# Patient Record
Sex: MALE | Race: WHITE | NOT HISPANIC OR LATINO | Employment: UNEMPLOYED | URBAN - METROPOLITAN AREA
[De-identification: names, ages, dates, MRNs, and addresses within clinical notes are randomized per-mention and may not be internally consistent; named-entity substitution may affect disease eponyms.]

---

## 2018-05-28 ENCOUNTER — HOSPITAL ENCOUNTER (EMERGENCY)
Facility: HOSPITAL | Age: 18
Discharge: HOME | End: 2018-05-28
Attending: EMERGENCY MEDICINE | Admitting: EMERGENCY MEDICINE
Payer: COMMERCIAL

## 2018-05-28 VITALS
RESPIRATION RATE: 18 BRPM | SYSTOLIC BLOOD PRESSURE: 139 MMHG | HEART RATE: 105 BPM | BODY MASS INDEX: 32.69 KG/M2 | WEIGHT: 196.21 LBS | HEIGHT: 65 IN | DIASTOLIC BLOOD PRESSURE: 79 MMHG | OXYGEN SATURATION: 96 %

## 2018-05-28 DIAGNOSIS — R21 RASH: Primary | ICD-10-CM

## 2018-05-28 PROCEDURE — 99282 EMERGENCY DEPT VISIT SF MDM: CPT

## 2018-05-28 NOTE — DISCHARGE INSTRUCTIONS
Acute Rash   WHAT YOU NEED TO KNOW:   A rash is irritation, redness, or itchiness in the skin or mucus membranes  Mucus membranes are areas such as the lining of your nose or throat  Acute means the rash starts suddenly, worsens quickly, and lasts a short time  An acute rash may be caused by a disease, such as hepatitis or vasculitis  The rash may be a reaction to something you are allergic to, such as certain foods, or latex  Certain medicines, including antibiotics, NSAIDs, prescription pain medicine, and aspirin can also cause a rash  DISCHARGE INSTRUCTIONS:   Return to the emergency department if:   · You have sudden trouble breathing or chest pain  · You are vomiting, have a headache or muscle aches, and your throat hurts  Contact your healthcare provider if:   · You have a fever  · You get open wounds from scratching your skin, or you have a wound that is red, swollen, or painful  · Your rash lasts longer than 3 months  · You have swelling or pain in your joints  · You have questions or concerns about your condition or care  Medicines:  If your rash does not go away on its own, you may need the following medicines:  · Antihistamines  may be given to help decrease itching  · Steroids  may be given to decrease inflammation  · Antibiotics  help fight or prevent a bacterial infection  · Take your medicine as directed  Contact your healthcare provider if you think your medicine is not helping or if you have side effects  Tell him of her if you are allergic to any medicine  Keep a list of the medicines, vitamins, and herbs you take  Include the amounts, and when and why you take them  Bring the list or the pill bottles to follow-up visits  Carry your medicine list with you in case of an emergency  Prevent a rash or care for your skin when you have a rash:  Dry skin can lead to more problems  Do not scratch your skin if it itches  You may cause a skin infection by scratching   The following may prevent dry skin, and help your skin look better:  · Use thick cream lotions or petroleum jelly to help soothe your rash  These products work well on areas with thick skin, such as your feet  Cool compresses may also be used to soothe your skin  Apply a cool compress or a cool, wet towel, and then cover it with a dry towel  · Use lukewarm water when you bathe  Hot water may damage your skin more  Pat your skin dry  Do not rub your skin with a towel  · Use detergents, soaps, shampoos, and bubble baths made for sensitive skin  Wear clothes that are made of cotton instead of nylon or wool  Cotton is softer, so it will not hurt your skin as much  Follow up with your healthcare provider as directed: You may need to see a dermatologist if healthcare providers do not know what is causing your rash  You may also need to see a dermatologist if your rash does not get better even with treatment  You may need to see a dietitian if you have allergies to foods  Write down your questions so you remember to ask them during your visits  © 2017 [address] [facility] Information is for End User's use only and may not be sold, redistributed or otherwise used for commercial purposes  All illustrations and images included in CareNotes® are the copyrighted property of [employer]  or [employer]  The above information is an  only  It is not intended as medical advice for individual conditions or treatments  Talk to your doctor, nurse or pharmacist before following any medical regimen to see if it is safe and effective for you

## 2018-05-28 NOTE — ED PROVIDER NOTES
History  Chief Complaint   Patient presents with    Rash     Pt presents to ED for evaluation and treatment of intermittent rash to arms and back which began a few months ago while pt was away at school  Pt states rash/hives will randomly "pop up" and then disappear  States hives started 45 minutes ago and has since resolved upon exam in ED       History provided by:  Patient   used: No      Patient is a 22-year-old male presenting to emergency department with a concern that he gets rashes from time to time  He had a rash today  It was itchy  No drainage from it  Currently without rash  No fevers, chills, nausea, vomiting  No lip or tongue swelling  No throat swelling  No difficulty breathing  No abdominal pain or diarrhea  No history of anaphylaxis  No new soaps, shampoos, clothes or pets  MDM normal exam, follow with PCP, follow up with an allergist if having rashes        None       No past medical history on file  No past surgical history on file  No family history on file  I have reviewed and agree with the history as documented  Social History   Substance Use Topics    Smoking status: Never Smoker    Smokeless tobacco: Not on file    Alcohol use No        Review of Systems   Constitutional: Negative for chills, diaphoresis and fever  Respiratory: Negative for cough, shortness of breath, wheezing and stridor  Cardiovascular: Negative for chest pain, palpitations and leg swelling  Gastrointestinal: Negative for abdominal pain, blood in stool, diarrhea, nausea and vomiting  Genitourinary: Negative for dysuria, frequency and urgency  Musculoskeletal: Negative for neck pain and neck stiffness  Skin: Negative for pallor and rash  Neurological: Negative for dizziness, syncope, weakness, light-headedness and headaches  All other systems reviewed and are negative        Physical Exam  Physical Exam   Constitutional: He is oriented to person, place, and time  He appears well-developed and well-nourished  HENT:   Head: Normocephalic and atraumatic  Eyes: Pupils are equal, round, and reactive to light  Neck: Neck supple  Cardiovascular: Normal rate, regular rhythm, normal heart sounds and intact distal pulses  Pulmonary/Chest: Effort normal and breath sounds normal  No respiratory distress  Abdominal: Soft  Bowel sounds are normal  There is no tenderness  Musculoskeletal: Normal range of motion  He exhibits no edema or tenderness  Neurological: He is alert and oriented to person, place, and time  Skin: Skin is warm and dry  Capillary refill takes less than 2 seconds  No erythema  No pallor  Vitals reviewed  Vital Signs  ED Triage Vitals [05/28/18 0252]   Temp Pulse Respirations Blood Pressure SpO2   -- 105 18 139/79 96 %      Temp src Heart Rate Source Patient Position - Orthostatic VS BP Location FiO2 (%)   -- Monitor Sitting Right arm --      Pain Score       No Pain           Vitals:    05/28/18 0252   BP: 139/79   Pulse: 105   Patient Position - Orthostatic VS: Sitting       Visual Acuity      ED Medications  Medications - No data to display    Diagnostic Studies  Results Reviewed     None                 No orders to display              Procedures  Procedures       Phone Contacts  ED Phone Contact    ED Course                               [facility]  [facility]    Disposition  Final diagnoses:   None     ED Disposition     None      Follow-up Information    None         Patient's Medications    No medications on file     No discharge procedures on file      ED Provider  Electronically Signed by           Yuki Burks MD  05/28/18 1858

## 2019-04-01 ENCOUNTER — OFFICE VISIT (OUTPATIENT)
Dept: FAMILY MEDICINE CLINIC | Facility: CLINIC | Age: 19
End: 2019-04-01

## 2019-04-01 VITALS
TEMPERATURE: 98.2 F | WEIGHT: 198 LBS | HEART RATE: 84 BPM | SYSTOLIC BLOOD PRESSURE: 120 MMHG | RESPIRATION RATE: 18 BRPM | DIASTOLIC BLOOD PRESSURE: 82 MMHG | HEIGHT: 65 IN | BODY MASS INDEX: 32.99 KG/M2

## 2019-04-01 DIAGNOSIS — Z00.00 HEALTHCARE MAINTENANCE: Primary | ICD-10-CM

## 2019-04-01 DIAGNOSIS — F41.9 ANXIETY: ICD-10-CM

## 2019-04-01 DIAGNOSIS — R00.2 PALPITATION: ICD-10-CM

## 2019-04-01 DIAGNOSIS — L50.8 CHRONIC URTICARIA: ICD-10-CM

## 2019-04-01 DIAGNOSIS — Z83.3 FAMILY HISTORY OF DIABETES MELLITUS (DM): ICD-10-CM

## 2019-04-01 DIAGNOSIS — Z13.6 SCREENING FOR CARDIOVASCULAR CONDITION: ICD-10-CM

## 2019-04-01 DIAGNOSIS — R36.0 PENILE DISCHARGE, WITHOUT BLOOD: ICD-10-CM

## 2019-04-01 PROCEDURE — 99385 PREV VISIT NEW AGE 18-39: CPT | Performed by: FAMILY MEDICINE

## 2019-04-01 RX ORDER — DIPHENHYDRAMINE HCL 50 MG
50 CAPSULE ORAL EVERY 6 HOURS PRN
Qty: 30 CAPSULE | Refills: 0 | Status: SHIPPED | OUTPATIENT
Start: 2019-04-01

## 2019-04-24 ENCOUNTER — APPOINTMENT (OUTPATIENT)
Dept: LAB | Facility: HOSPITAL | Age: 19
End: 2019-04-24
Payer: COMMERCIAL

## 2019-04-24 DIAGNOSIS — R36.0 PENILE DISCHARGE, WITHOUT BLOOD: ICD-10-CM

## 2019-04-24 DIAGNOSIS — Z13.6 SCREENING FOR CARDIOVASCULAR CONDITION: ICD-10-CM

## 2019-04-24 DIAGNOSIS — Z83.3 FAMILY HISTORY OF DIABETES MELLITUS (DM): ICD-10-CM

## 2019-04-24 DIAGNOSIS — Z00.00 HEALTHCARE MAINTENANCE: ICD-10-CM

## 2019-04-24 DIAGNOSIS — L50.8 CHRONIC URTICARIA: ICD-10-CM

## 2019-04-24 DIAGNOSIS — R00.2 PALPITATION: ICD-10-CM

## 2019-04-24 LAB
ALBUMIN SERPL BCP-MCNC: 4.2 G/DL (ref 3.5–5)
ALP SERPL-CCNC: 91 U/L (ref 46–116)
ALT SERPL W P-5'-P-CCNC: 31 U/L (ref 12–78)
ANION GAP SERPL CALCULATED.3IONS-SCNC: 3 MMOL/L (ref 4–13)
AST SERPL W P-5'-P-CCNC: 14 U/L (ref 5–45)
BACTERIA UR QL AUTO: NORMAL /HPF
BASOPHILS # BLD AUTO: 0.04 THOUSANDS/ΜL (ref 0–0.1)
BASOPHILS NFR BLD AUTO: 1 % (ref 0–1)
BILIRUB SERPL-MCNC: 0.45 MG/DL (ref 0.2–1)
BILIRUB UR QL STRIP: NEGATIVE
BUN SERPL-MCNC: 9 MG/DL (ref 5–25)
CALCIUM SERPL-MCNC: 8.9 MG/DL (ref 8.3–10.1)
CHLORIDE SERPL-SCNC: 106 MMOL/L (ref 100–108)
CHOLEST SERPL-MCNC: 140 MG/DL (ref 50–200)
CLARITY UR: ABNORMAL
CO2 SERPL-SCNC: 28 MMOL/L (ref 21–32)
COLOR UR: YELLOW
CREAT SERPL-MCNC: 0.89 MG/DL (ref 0.6–1.3)
EOSINOPHIL # BLD AUTO: 0.31 THOUSAND/ΜL (ref 0–0.61)
EOSINOPHIL NFR BLD AUTO: 6 % (ref 0–6)
ERYTHROCYTE [DISTWIDTH] IN BLOOD BY AUTOMATED COUNT: 12.3 % (ref 11.6–15.1)
GFR SERPL CREATININE-BSD FRML MDRD: 123 ML/MIN/1.73SQ M
GLUCOSE P FAST SERPL-MCNC: 78 MG/DL (ref 65–99)
GLUCOSE UR STRIP-MCNC: NEGATIVE MG/DL
HCT VFR BLD AUTO: 47.3 % (ref 36.5–49.3)
HDLC SERPL-MCNC: 39 MG/DL (ref 40–60)
HGB BLD-MCNC: 15.7 G/DL (ref 12–17)
HGB UR QL STRIP.AUTO: NEGATIVE
HYALINE CASTS #/AREA URNS LPF: NORMAL /LPF
IMM GRANULOCYTES # BLD AUTO: 0.01 THOUSAND/UL (ref 0–0.2)
IMM GRANULOCYTES NFR BLD AUTO: 0 % (ref 0–2)
KETONES UR STRIP-MCNC: NEGATIVE MG/DL
LDLC SERPL CALC-MCNC: 87 MG/DL (ref 0–100)
LEUKOCYTE ESTERASE UR QL STRIP: NEGATIVE
LYMPHOCYTES # BLD AUTO: 1.52 THOUSANDS/ΜL (ref 0.6–4.47)
LYMPHOCYTES NFR BLD AUTO: 29 % (ref 14–44)
MCH RBC QN AUTO: 29.8 PG (ref 26.8–34.3)
MCHC RBC AUTO-ENTMCNC: 33.2 G/DL (ref 31.4–37.4)
MCV RBC AUTO: 90 FL (ref 82–98)
MONOCYTES # BLD AUTO: 0.39 THOUSAND/ΜL (ref 0.17–1.22)
MONOCYTES NFR BLD AUTO: 7 % (ref 4–12)
NEUTROPHILS # BLD AUTO: 3.06 THOUSANDS/ΜL (ref 1.85–7.62)
NEUTS SEG NFR BLD AUTO: 57 % (ref 43–75)
NITRITE UR QL STRIP: NEGATIVE
NON-SQ EPI CELLS URNS QL MICRO: NORMAL /HPF
NRBC BLD AUTO-RTO: 0 /100 WBCS
PH UR STRIP.AUTO: 8.5 [PH]
PLATELET # BLD AUTO: 199 THOUSANDS/UL (ref 149–390)
PMV BLD AUTO: 9.8 FL (ref 8.9–12.7)
POTASSIUM SERPL-SCNC: 3.7 MMOL/L (ref 3.5–5.3)
PROT SERPL-MCNC: 7.6 G/DL (ref 6.4–8.2)
PROT UR STRIP-MCNC: ABNORMAL MG/DL
RBC # BLD AUTO: 5.26 MILLION/UL (ref 3.88–5.62)
RBC #/AREA URNS AUTO: NORMAL /HPF
SODIUM SERPL-SCNC: 137 MMOL/L (ref 136–145)
SP GR UR STRIP.AUTO: 1.02 (ref 1–1.03)
TRIGL SERPL-MCNC: 68 MG/DL
UROBILINOGEN UR QL STRIP.AUTO: 0.2 E.U./DL
WBC # BLD AUTO: 5.33 THOUSAND/UL (ref 4.31–10.16)
WBC #/AREA URNS AUTO: NORMAL /HPF

## 2019-04-24 PROCEDURE — 80053 COMPREHEN METABOLIC PANEL: CPT

## 2019-04-24 PROCEDURE — 87491 CHLMYD TRACH DNA AMP PROBE: CPT

## 2019-04-24 PROCEDURE — 80061 LIPID PANEL: CPT

## 2019-04-24 PROCEDURE — 86592 SYPHILIS TEST NON-TREP QUAL: CPT

## 2019-04-24 PROCEDURE — 36415 COLL VENOUS BLD VENIPUNCTURE: CPT

## 2019-04-24 PROCEDURE — 85025 COMPLETE CBC W/AUTO DIFF WBC: CPT

## 2019-04-24 PROCEDURE — 87591 N.GONORRHOEAE DNA AMP PROB: CPT

## 2019-04-24 PROCEDURE — 81001 URINALYSIS AUTO W/SCOPE: CPT | Performed by: FAMILY MEDICINE

## 2019-04-25 LAB — RPR SER QL: NORMAL

## 2019-04-29 LAB
C TRACH DNA SPEC QL NAA+PROBE: NEGATIVE
N GONORRHOEA DNA SPEC QL NAA+PROBE: NEGATIVE

## 2019-05-10 ENCOUNTER — OFFICE VISIT (OUTPATIENT)
Dept: FAMILY MEDICINE CLINIC | Facility: CLINIC | Age: 19
End: 2019-05-10

## 2019-05-10 ENCOUNTER — TELEPHONE (OUTPATIENT)
Dept: FAMILY MEDICINE CLINIC | Facility: CLINIC | Age: 19
End: 2019-05-10

## 2019-05-10 VITALS
TEMPERATURE: 97.9 F | DIASTOLIC BLOOD PRESSURE: 70 MMHG | BODY MASS INDEX: 32.32 KG/M2 | WEIGHT: 194 LBS | HEART RATE: 76 BPM | RESPIRATION RATE: 18 BRPM | HEIGHT: 65 IN | SYSTOLIC BLOOD PRESSURE: 118 MMHG

## 2019-05-10 DIAGNOSIS — F41.9 ANXIETY: ICD-10-CM

## 2019-05-10 DIAGNOSIS — L50.8 CHRONIC URTICARIA: Primary | ICD-10-CM

## 2019-05-10 DIAGNOSIS — R36.0 PENILE DISCHARGE, WITHOUT BLOOD: ICD-10-CM

## 2019-05-10 DIAGNOSIS — L73.9 FOLLICULITIS: ICD-10-CM

## 2019-05-10 PROBLEM — Z13.6 SCREENING FOR CARDIOVASCULAR CONDITION: Status: RESOLVED | Noted: 2019-04-01 | Resolved: 2019-05-10

## 2019-05-10 PROBLEM — R00.2 PALPITATION: Status: RESOLVED | Noted: 2019-04-01 | Resolved: 2019-05-10

## 2019-05-10 PROBLEM — Z00.00 HEALTHCARE MAINTENANCE: Status: RESOLVED | Noted: 2019-04-01 | Resolved: 2019-05-10

## 2019-05-10 PROCEDURE — 99213 OFFICE O/P EST LOW 20 MIN: CPT | Performed by: FAMILY MEDICINE

## 2019-05-10 PROCEDURE — 3008F BODY MASS INDEX DOCD: CPT | Performed by: FAMILY MEDICINE

## 2019-05-10 RX ORDER — CEPHALEXIN 500 MG/1
500 CAPSULE ORAL EVERY 12 HOURS SCHEDULED
Qty: 14 CAPSULE | Refills: 0 | Status: SHIPPED | OUTPATIENT
Start: 2019-05-10 | End: 2019-05-17

## 2019-05-22 ENCOUNTER — TELEPHONE (OUTPATIENT)
Dept: FAMILY MEDICINE CLINIC | Facility: CLINIC | Age: 19
End: 2019-05-22

## 2019-05-22 DIAGNOSIS — R36.0 PENILE DISCHARGE, WITHOUT BLOOD: Primary | ICD-10-CM

## 2019-05-24 ENCOUNTER — TELEPHONE (OUTPATIENT)
Dept: FAMILY MEDICINE CLINIC | Facility: CLINIC | Age: 19
End: 2019-05-24

## 2019-09-12 ENCOUNTER — CLINICAL SUPPORT (OUTPATIENT)
Dept: FAMILY MEDICINE CLINIC | Facility: CLINIC | Age: 19
End: 2019-09-12

## 2019-09-12 DIAGNOSIS — F41.9 ANXIETY: Primary | ICD-10-CM

## 2019-09-12 DIAGNOSIS — Z74.8 ASSISTANCE WITH TRANSPORTATION: ICD-10-CM

## 2019-09-12 NOTE — PROGRESS NOTES
Sammy Andrade struggles with anxiety  Part of his anxiety is social  He is interested in psychotherapy  However, the times that he has available for therapy, do not fit the times that I have available  Moreover, he has transportation issues because he does not drive, and money is tight  He will need assistance with transportation

## 2019-09-12 NOTE — PROGRESS NOTES
Data  This was my first session with Maxim  He reported being referred to me due to anxiety  According to Maxim, he has been feeling anxious for most of his life  Below is how his anxiety is affecting his life  School:  He has 2 and a half years of college, but has to drop off due to financial issues  He would like to go back and complete his major in theater  Family:  He does not speak to his biological father who  from his mother when he was born  He reported close relationship with mother and step father  Living situation  He reported feeling safe at home  He also reported feeling safe in his neighborhood  Coping:  He denied suicidal or homicidal ideations  He denied substance abuse  He denied smoking  Showed signs of social anxiety  Positive coping  Walking  Writing thoughts  Community support:  None reported  Transportation  None reported  Motivation to change:  Insightful and willing to try therapy  Jaleel Davidson is a young man, who is struggling with anxiety  He has never attended therapy  However, he is willing to try  At this point, he is changing from one job to another and is not sure about his schedule  The days that he has currently available, are not the days that I am available to see patients  I suggested him to contact his insurance for a list of mental health services in the community  However, due to his social anxiety, he requested help with this area  I told him that our  could help with this process  He is agreeable to speak with social work  I will place a referral to social work to help with mental health resources and transportation since [place] does not drive, and has financial constraints

## 2019-09-13 ENCOUNTER — PATIENT OUTREACH (OUTPATIENT)
Dept: FAMILY MEDICINE CLINIC | Facility: CLINIC | Age: 19
End: 2019-09-13

## 2019-09-20 ENCOUNTER — PATIENT OUTREACH (OUTPATIENT)
Dept: FAMILY MEDICINE CLINIC | Facility: CLINIC | Age: 19
End: 2019-09-20

## 2020-01-03 NOTE — PROGRESS NOTES
Received request to contact patient to provide support regarding outpatient mental health services in addition to transportation services  Call to patient regarding same  Patient reports he has been experiencing anxiety and is interested in pursuing counseling to address this  He reports that he is single with no children and resides with his mother and step-father  Patient reports he is employed and will be starting a new job  He reports that he has friends who assist him with transportation or he uses [employer]  Patient reports that he will also be using the public bus as he will need to use this to get to his new job  Supportive counseling provided to patient  Reviewed local in network counseling options and contact information provided  [facility] contact information also provided and patient will contact [facility] for further support as needed  Patient denies further needs at this time  Will continue to be available to provide support  [Negative] : Heme/Lymph [Joint Pain] : joint pain [Joint Stiffness] : joint stiffness

## 2020-09-24 NOTE — PROGRESS NOTES
BMI Counseling: Body mass index is 33 22 kg/m²  The BMI is above normal  Nutrition recommendations include 3-5 servings of fruits/vegetables daily, consuming healthier snacks, decreasing soda and/or juice intake and moderation in carbohydrate intake  Exercise recommendations include moderate aerobic physical activity for 150 minutes/week and exercising 3-5 times per week  Assessment/Plan:    Abdominal pressure  Patient concern for abdominal pressure  Exam today does not reveal any splenomegaly, no hernia, no structural abnormality of the abdominal wall  Differential diagnosis include increased gas, IBS, constipation etcetera  Patient cannot provide details in regards to his bowel movement habit  Will encourage patient to document his food intake as well as his bowel movements for the next month and discuss again at next visit with his annual physical exam    Anxiety  Likely contributing patient's multiple of somatic symptoms  Discussed specific techniques to relax the chest square breathing  Will continue to reassess and help with lifestyle modification    Chronic low back pain  Patient with baseline back pain likely secondary to posture  Recommended course muscle strengthening exercise  Will refer patient to on T4 treatment evaluation  On exam today patient does appear to have a even hiding his shoulder which could suggest a mild scoliosis      Subjective:      Patient ID: Carlito Lance is a 23 y o  male  HPI    13-year-old male patient presents with roughly 1 month history of left-sided abdominal pressure  Patient states he was working at [employer] when he 1st noticed his symptoms  This initially resolved on its on but has returned  Patient denies any increase in his symptoms, denies any pain but states he still does have a feeling of pressure on the left upper quadrant of his abdomen  Patient denies any recent injury, denies any recent falls today area    Has not noticed any lymphadenopathy or increased fatigue  Patient reports he has history of anxiety which can affect his bowel movement  Patient states he goes to the bathroom 1 to 2 times a day and does not have any issue with constipation  Review of Systems   Constitutional: Negative for chills and fever  HENT: Negative for congestion, rhinorrhea and sore throat  Respiratory: Negative for cough and shortness of breath  Cardiovascular: Negative for chest pain and palpitations  Gastrointestinal: Negative for abdominal distention, abdominal pain, blood in stool, constipation, diarrhea, nausea and vomiting  Genitourinary: Negative for dysuria and hematuria  Musculoskeletal: Positive for back pain  Negative for gait problem  Stable back pain   Skin: Negative for rash  Allergic/Immunologic: Negative for environmental allergies and food allergies  Neurological: Positive for headaches  Negative for dizziness and light-headedness  Stress   Psychiatric/Behavioral: Positive for sleep disturbance  Baseline issue with falling asleep     Objective:    /70 (BP Location: Left arm, Patient Position: Sitting, Cuff Size: Large)   Pulse 103   Temp 97 5 °F (36 4 °C) (Tympanic)   Resp 18   Ht 5' 5" (1 651 m)   Wt 90 5 kg (199 lb 9 6 oz)   BMI 33 22 kg/m²     Physical Exam  Vitals signs reviewed  Constitutional:       General: He is not in acute distress  Appearance: Normal appearance  He is obese  He is not ill-appearing, toxic-appearing or diaphoretic  Eyes:      General:         Right eye: No discharge  Left eye: No discharge  Extraocular Movements: Extraocular movements intact  Conjunctiva/sclera: Conjunctivae normal       Pupils: Pupils are equal, round, and reactive to light  Neck:      Musculoskeletal: No neck rigidity  Cardiovascular:      Rate and Rhythm: Normal rate and regular rhythm  Pulses: Normal pulses  Heart sounds: Normal heart sounds  No murmur  No friction rub   No gallop  Pulmonary:      Effort: Pulmonary effort is normal  No respiratory distress  Breath sounds: Normal breath sounds  Abdominal:      General: Abdomen is flat  Bowel sounds are normal  There is no distension  Palpations: Abdomen is soft  There is no mass  Tenderness: There is no abdominal tenderness  There is no right CVA tenderness, left CVA tenderness, guarding or rebound  Hernia: No hernia is present  Musculoskeletal: Normal range of motion  General: No swelling, tenderness, deformity or signs of injury  Right lower leg: No edema  Left lower leg: No edema  Comments: Right shoulder minimally elevated compared to the left at sitting and standing position   Skin:     General: Skin is warm and dry  Capillary Refill: Capillary refill takes less than 2 seconds  Coloration: Skin is not jaundiced or pale  Findings: No bruising, erythema, lesion or rash  Neurological:      General: No focal deficit present  Mental Status: He is alert and oriented to person, place, and time  Psychiatric:         Mood and Affect: Mood normal             ROSIE Ward    Family Medicine, PGY-3

## 2020-09-25 ENCOUNTER — OFFICE VISIT (OUTPATIENT)
Dept: FAMILY MEDICINE CLINIC | Facility: CLINIC | Age: 20
End: 2020-09-25

## 2020-09-25 VITALS
DIASTOLIC BLOOD PRESSURE: 70 MMHG | WEIGHT: 199.6 LBS | SYSTOLIC BLOOD PRESSURE: 118 MMHG | TEMPERATURE: 97.5 F | HEART RATE: 103 BPM | RESPIRATION RATE: 18 BRPM | HEIGHT: 65 IN | BODY MASS INDEX: 33.26 KG/M2

## 2020-09-25 DIAGNOSIS — Z11.4 SCREENING FOR HIV (HUMAN IMMUNODEFICIENCY VIRUS): Primary | ICD-10-CM

## 2020-09-25 DIAGNOSIS — G89.29 CHRONIC BILATERAL LOW BACK PAIN, UNSPECIFIED WHETHER SCIATICA PRESENT: ICD-10-CM

## 2020-09-25 DIAGNOSIS — F41.9 ANXIETY: ICD-10-CM

## 2020-09-25 DIAGNOSIS — M54.50 CHRONIC BILATERAL LOW BACK PAIN, UNSPECIFIED WHETHER SCIATICA PRESENT: ICD-10-CM

## 2020-09-25 DIAGNOSIS — R10.9 ABDOMINAL PRESSURE: ICD-10-CM

## 2020-09-25 PROCEDURE — 99213 OFFICE O/P EST LOW 20 MIN: CPT | Performed by: FAMILY MEDICINE

## 2020-09-25 PROCEDURE — 3725F SCREEN DEPRESSION PERFORMED: CPT | Performed by: FAMILY MEDICINE

## 2020-09-25 NOTE — ASSESSMENT & PLAN NOTE
Patient with baseline back pain likely secondary to posture  Recommended course muscle strengthening exercise  Will refer patient to on T4 treatment evaluation  On exam today patient does appear to have a even hiding his shoulder which could suggest a mild scoliosis

## 2020-09-25 NOTE — ASSESSMENT & PLAN NOTE
Likely contributing patient's multiple of somatic symptoms  Discussed specific techniques to relax the chest square breathing  Will continue to reassess and help with lifestyle modification

## 2020-09-25 NOTE — ASSESSMENT & PLAN NOTE
Patient concern for abdominal pressure  Exam today does not reveal any splenomegaly, no hernia, no structural abnormality of the abdominal wall  Differential diagnosis include increased gas, IBS, constipation etcetera  Patient cannot provide details in regards to his bowel movement habit  Will encourage patient to document his food intake as well as his bowel movements for the next month and discuss again at next visit with his annual physical exam

## 2020-09-28 ENCOUNTER — TELEMEDICINE (OUTPATIENT)
Dept: FAMILY MEDICINE CLINIC | Facility: CLINIC | Age: 20
End: 2020-09-28

## 2020-09-28 DIAGNOSIS — A08.4 VIRAL GASTROENTERITIS: Primary | ICD-10-CM

## 2020-09-28 PROCEDURE — 99213 OFFICE O/P EST LOW 20 MIN: CPT | Performed by: FAMILY MEDICINE

## 2020-09-28 PROCEDURE — 1036F TOBACCO NON-USER: CPT | Performed by: FAMILY MEDICINE

## 2020-09-28 NOTE — ASSESSMENT & PLAN NOTE
Nausea and fatigue,  Most likely secondary to viral gastro   Discussed warning signs with patient and advise continued hydration  With shared decision making it was decided that we would not do COVID-19 testing as suspicion is low   Discussed for patient to continue to monitor symptoms for the next week, and in the event his symptoms worsen or he does not improve to call back for another appointment to discuss possibility of testing for COVID-19

## 2020-09-28 NOTE — PROGRESS NOTES
Virtual Regular Visit      Assessment/Plan:    Problem List Items Addressed This Visit        Digestive    Viral gastroenteritis - Primary      Nausea and fatigue,  Most likely secondary to viral gastro   Discussed warning signs with patient and advise continued hydration  With shared decision making it was decided that we would not do COVID-19 testing as suspicion is low   Discussed for patient to continue to monitor symptoms for the next week, and in the event his symptoms worsen or he does not improve to call back for another appointment to discuss possibility of testing for COVID-19                    Reason for visit is   Chief Complaint   Patient presents with    Virtual Regular Visit        Encounter provider Henri Seo MD    Provider located at [address] [id number] [phone]      Recent Visits  Date Type Provider Dept   09/25/20 Office Visit MD Clayton Ziegler recent visits within past 7 days and meeting all other requirements     Today's Visits  Date Type Provider Dept   09/28/20 Telemedicine MD Clayton Cuevas today's visits and meeting all other requirements     Future Appointments  No visits were found meeting these conditions  Showing future appointments within next 150 days and meeting all other requirements        The patient was identified by name and date of birth  Ann Farrell was informed that this is a telemedicine visit and that the visit is being conducted through [employer]  My office door was closed  No one else was in the room  He acknowledged consent and understanding of privacy and security of the video platform  The patient has agreed to participate and understands they can discontinue the visit at any time  Patient is aware this is a billable service  Subjective  Ann Farrell is a 23 y o  male          Patient is calling today for new onset nausea and fatigue  Patient states that beginning yesterday started to notice some nausea  He states his symptoms  Typically come and go under not constant  He states that he still has an appetite and is able to eat and drink appropriately  He denies vomiting, abdominal pain, fever / chills  He states that he has not remember eating anything out of the ordinary yesterday  He does report that he has a mild cough that is nonproductive  He is worried about having COVID and  Just wanted to discuss his symptoms with a provider  Patient states that he works at [employer] but has not been around any other person who is under investigation for COVID-19 or a confirmed case  No past medical history on file  No past surgical history on file  Current Outpatient Medications   Medication Sig Dispense Refill    diphenhydrAMINE (BENADRYL) 50 mg capsule Take 1 capsule (50 mg total) by mouth every 6 (six) hours as needed for itching or allergies (Patient not taking: Reported on 5/10/2019) 30 capsule 0     No current facility-administered medications for this visit  No Known Allergies    Review of Systems   Constitutional: Positive for fatigue  Negative for appetite change, chills and fever  HENT: Negative for congestion, postnasal drip and sore throat  Eyes: Negative for visual disturbance  Respiratory: Positive for cough  Negative for chest tightness and shortness of breath  Cardiovascular: Negative for chest pain  Gastrointestinal: Positive for nausea  Negative for abdominal pain, constipation, diarrhea and vomiting  Skin: Negative for color change, pallor and rash  Neurological: Negative for dizziness, weakness, light-headedness and headaches  Video Exam    There were no vitals filed for this visit  Physical Exam  Constitutional:       General: He is not in acute distress  Appearance: Normal appearance  He is normal weight  He is not ill-appearing     HENT:      Head: Normocephalic and atraumatic  Nose: Nose normal    Eyes:      Conjunctiva/sclera: Conjunctivae normal    Neck:      Musculoskeletal: Normal range of motion  Pulmonary:      Effort: Pulmonary effort is normal  No respiratory distress  Neurological:      Mental Status: He is alert  I spent 10 minutes directly with the patient during this visit      [address] acknowledges that he has consented to an online visit or consultation  He understands that the online visit is based solely on information provided by him, and that, in the absence of a face-to-face physical evaluation by the physician, the diagnosis he receives is both limited and provisional in terms of accuracy and completeness  This is not intended to replace a full medical face-to-face evaluation by the physician  Reuben Rod understands and accepts these terms

## 2020-10-10 ENCOUNTER — HOSPITAL ENCOUNTER (EMERGENCY)
Facility: HOSPITAL | Age: 20
Discharge: HOME | End: 2020-10-10
Attending: EMERGENCY MEDICINE | Admitting: EMERGENCY MEDICINE
Payer: COMMERCIAL

## 2020-10-10 VITALS
SYSTOLIC BLOOD PRESSURE: 155 MMHG | HEART RATE: 90 BPM | TEMPERATURE: 98.7 F | OXYGEN SATURATION: 98 % | DIASTOLIC BLOOD PRESSURE: 88 MMHG | RESPIRATION RATE: 18 BRPM

## 2020-10-10 DIAGNOSIS — R10.9 ABDOMINAL DISCOMFORT: Primary | ICD-10-CM

## 2020-10-10 LAB
ALBUMIN SERPL BCP-MCNC: 4.2 G/DL (ref 3.5–5)
ALP SERPL-CCNC: 96 U/L (ref 46–116)
ALT SERPL W P-5'-P-CCNC: 42 U/L (ref 12–78)
ANION GAP SERPL CALCULATED.3IONS-SCNC: 2 MMOL/L (ref 4–13)
AST SERPL W P-5'-P-CCNC: 18 U/L (ref 5–45)
BASOPHILS # BLD AUTO: 0.02 THOUSANDS/ΜL (ref 0–0.1)
BASOPHILS NFR BLD AUTO: 0 % (ref 0–1)
BILIRUB SERPL-MCNC: 0.13 MG/DL (ref 0.2–1)
BILIRUB UR QL STRIP: NEGATIVE
BUN SERPL-MCNC: 8 MG/DL (ref 5–25)
CALCIUM SERPL-MCNC: 8.8 MG/DL (ref 8.3–10.1)
CHLORIDE SERPL-SCNC: 105 MMOL/L (ref 100–108)
CLARITY UR: CLEAR
CO2 SERPL-SCNC: 33 MMOL/L (ref 21–32)
COLOR UR: YELLOW
CREAT SERPL-MCNC: 1.09 MG/DL (ref 0.6–1.3)
EOSINOPHIL # BLD AUTO: 0.13 THOUSAND/ΜL (ref 0–0.61)
EOSINOPHIL NFR BLD AUTO: 1 % (ref 0–6)
ERYTHROCYTE [DISTWIDTH] IN BLOOD BY AUTOMATED COUNT: 12.1 % (ref 11.6–15.1)
GFR SERPL CREATININE-BSD FRML MDRD: 96 ML/MIN/1.73SQ M
GLUCOSE SERPL-MCNC: 109 MG/DL (ref 65–140)
GLUCOSE UR STRIP-MCNC: NEGATIVE MG/DL
HCT VFR BLD AUTO: 42.8 % (ref 36.5–49.3)
HGB BLD-MCNC: 14.8 G/DL (ref 12–17)
HGB UR QL STRIP.AUTO: NEGATIVE
IMM GRANULOCYTES # BLD AUTO: 0.02 THOUSAND/UL (ref 0–0.2)
IMM GRANULOCYTES NFR BLD AUTO: 0 % (ref 0–2)
KETONES UR STRIP-MCNC: NEGATIVE MG/DL
LEUKOCYTE ESTERASE UR QL STRIP: NEGATIVE
LIPASE SERPL-CCNC: 97 U/L (ref 73–393)
LYMPHOCYTES # BLD AUTO: 2.06 THOUSANDS/ΜL (ref 0.6–4.47)
LYMPHOCYTES NFR BLD AUTO: 21 % (ref 14–44)
MCH RBC QN AUTO: 30.1 PG (ref 26.8–34.3)
MCHC RBC AUTO-ENTMCNC: 34.6 G/DL (ref 31.4–37.4)
MCV RBC AUTO: 87 FL (ref 82–98)
MONOCYTES # BLD AUTO: 0.74 THOUSAND/ΜL (ref 0.17–1.22)
MONOCYTES NFR BLD AUTO: 8 % (ref 4–12)
NEUTROPHILS # BLD AUTO: 6.82 THOUSANDS/ΜL (ref 1.85–7.62)
NEUTS SEG NFR BLD AUTO: 70 % (ref 43–75)
NITRITE UR QL STRIP: NEGATIVE
NRBC BLD AUTO-RTO: 0 /100 WBCS
PH UR STRIP.AUTO: 7 [PH] (ref 4.5–8)
PLATELET # BLD AUTO: 215 THOUSANDS/UL (ref 149–390)
PMV BLD AUTO: 9.3 FL (ref 8.9–12.7)
POTASSIUM SERPL-SCNC: 3.5 MMOL/L (ref 3.5–5.3)
PROT SERPL-MCNC: 7.6 G/DL (ref 6.4–8.2)
PROT UR STRIP-MCNC: NEGATIVE MG/DL
RBC # BLD AUTO: 4.91 MILLION/UL (ref 3.88–5.62)
SODIUM SERPL-SCNC: 140 MMOL/L (ref 136–145)
SP GR UR STRIP.AUTO: 1.02 (ref 1–1.03)
UROBILINOGEN UR QL STRIP.AUTO: 1 E.U./DL
WBC # BLD AUTO: 9.79 THOUSAND/UL (ref 4.31–10.16)

## 2020-10-10 PROCEDURE — 80053 COMPREHEN METABOLIC PANEL: CPT | Performed by: EMERGENCY MEDICINE

## 2020-10-10 PROCEDURE — 85025 COMPLETE CBC W/AUTO DIFF WBC: CPT | Performed by: EMERGENCY MEDICINE

## 2020-10-10 PROCEDURE — 99282 EMERGENCY DEPT VISIT SF MDM: CPT | Performed by: EMERGENCY MEDICINE

## 2020-10-10 PROCEDURE — 83690 ASSAY OF LIPASE: CPT | Performed by: EMERGENCY MEDICINE

## 2020-10-10 PROCEDURE — 36415 COLL VENOUS BLD VENIPUNCTURE: CPT | Performed by: EMERGENCY MEDICINE

## 2020-10-10 PROCEDURE — 81003 URINALYSIS AUTO W/O SCOPE: CPT

## 2020-10-10 PROCEDURE — 99284 EMERGENCY DEPT VISIT MOD MDM: CPT

## 2020-10-21 ENCOUNTER — OFFICE VISIT (OUTPATIENT)
Dept: FAMILY MEDICINE CLINIC | Facility: CLINIC | Age: 20
End: 2020-10-21

## 2020-10-21 VITALS — BODY MASS INDEX: 33.29 KG/M2 | WEIGHT: 199.8 LBS | HEIGHT: 65 IN

## 2020-10-21 DIAGNOSIS — M54.50 CHRONIC BILATERAL LOW BACK PAIN, UNSPECIFIED WHETHER SCIATICA PRESENT: Primary | ICD-10-CM

## 2020-10-21 DIAGNOSIS — G89.29 CHRONIC BILATERAL LOW BACK PAIN, UNSPECIFIED WHETHER SCIATICA PRESENT: Primary | ICD-10-CM

## 2020-10-21 PROCEDURE — 3008F BODY MASS INDEX DOCD: CPT | Performed by: FAMILY MEDICINE

## 2020-10-21 PROCEDURE — 98927 OSTEOPATH MANJ 5-6 REGIONS: CPT | Performed by: FAMILY MEDICINE

## 2020-10-27 ENCOUNTER — OFFICE VISIT (OUTPATIENT)
Dept: FAMILY MEDICINE CLINIC | Facility: CLINIC | Age: 20
End: 2020-10-27

## 2020-10-27 VITALS
DIASTOLIC BLOOD PRESSURE: 100 MMHG | SYSTOLIC BLOOD PRESSURE: 150 MMHG | WEIGHT: 201.6 LBS | HEIGHT: 65 IN | BODY MASS INDEX: 33.59 KG/M2 | TEMPERATURE: 96.6 F | HEART RATE: 94 BPM | RESPIRATION RATE: 18 BRPM

## 2020-10-27 DIAGNOSIS — Z11.4 SCREENING FOR HIV (HUMAN IMMUNODEFICIENCY VIRUS): ICD-10-CM

## 2020-10-27 DIAGNOSIS — Z83.3 FAMILY HISTORY OF DIABETES MELLITUS (DM): ICD-10-CM

## 2020-10-27 DIAGNOSIS — R10.9 ABDOMINAL PRESSURE: ICD-10-CM

## 2020-10-27 DIAGNOSIS — Z00.00 ANNUAL PHYSICAL EXAM: Primary | ICD-10-CM

## 2020-10-27 DIAGNOSIS — R03.0 ELEVATED BLOOD PRESSURE READING: ICD-10-CM

## 2020-10-27 PROCEDURE — 99395 PREV VISIT EST AGE 18-39: CPT | Performed by: FAMILY MEDICINE

## 2020-11-03 ENCOUNTER — OFFICE VISIT (OUTPATIENT)
Dept: FAMILY MEDICINE CLINIC | Facility: CLINIC | Age: 20
End: 2020-11-03

## 2020-11-03 VITALS — WEIGHT: 201.6 LBS | HEIGHT: 65 IN | BODY MASS INDEX: 33.59 KG/M2 | TEMPERATURE: 97.8 F

## 2020-11-03 DIAGNOSIS — M54.50 CHRONIC BILATERAL LOW BACK PAIN, UNSPECIFIED WHETHER SCIATICA PRESENT: Primary | ICD-10-CM

## 2020-11-03 DIAGNOSIS — M99.02 SOMATIC DYSFUNCTION OF THORACIC REGION: ICD-10-CM

## 2020-11-03 DIAGNOSIS — G89.29 CHRONIC BILATERAL LOW BACK PAIN, UNSPECIFIED WHETHER SCIATICA PRESENT: Primary | ICD-10-CM

## 2020-11-03 DIAGNOSIS — M99.01 SOMATIC DYSFUNCTION OF CERVICAL REGION: ICD-10-CM

## 2020-11-03 DIAGNOSIS — M99.03 SOMATIC DYSFUNCTION OF LUMBAR REGION: ICD-10-CM

## 2020-11-03 DIAGNOSIS — M99.04 SOMATIC DYSFUNCTION OF SACRAL REGION: ICD-10-CM

## 2020-11-03 PROCEDURE — 99213 OFFICE O/P EST LOW 20 MIN: CPT | Performed by: FAMILY MEDICINE

## 2020-11-17 ENCOUNTER — OFFICE VISIT (OUTPATIENT)
Dept: FAMILY MEDICINE CLINIC | Facility: CLINIC | Age: 20
End: 2020-11-17

## 2020-11-17 VITALS — TEMPERATURE: 96.1 F

## 2020-11-17 DIAGNOSIS — M99.02 SOMATIC DYSFUNCTION OF THORACIC REGION: ICD-10-CM

## 2020-11-17 DIAGNOSIS — M99.01 SOMATIC DYSFUNCTION OF CERVICAL REGION: ICD-10-CM

## 2020-11-17 DIAGNOSIS — M99.04 SOMATIC DYSFUNCTION OF SACRAL REGION: ICD-10-CM

## 2020-11-17 DIAGNOSIS — G89.29 CHRONIC BILATERAL LOW BACK PAIN, UNSPECIFIED WHETHER SCIATICA PRESENT: Primary | ICD-10-CM

## 2020-11-17 DIAGNOSIS — M54.50 CHRONIC BILATERAL LOW BACK PAIN, UNSPECIFIED WHETHER SCIATICA PRESENT: Primary | ICD-10-CM

## 2020-11-17 DIAGNOSIS — M99.03 SOMATIC DYSFUNCTION OF LUMBAR REGION: ICD-10-CM

## 2020-11-17 PROCEDURE — 99213 OFFICE O/P EST LOW 20 MIN: CPT | Performed by: FAMILY MEDICINE

## 2020-11-18 ENCOUNTER — HOSPITAL ENCOUNTER (OUTPATIENT)
Dept: RADIOLOGY | Facility: HOSPITAL | Age: 20
Discharge: HOME | End: 2020-11-18
Payer: COMMERCIAL

## 2020-11-18 DIAGNOSIS — M54.50 CHRONIC BILATERAL LOW BACK PAIN, UNSPECIFIED WHETHER SCIATICA PRESENT: ICD-10-CM

## 2020-11-18 DIAGNOSIS — G89.29 CHRONIC BILATERAL LOW BACK PAIN, UNSPECIFIED WHETHER SCIATICA PRESENT: ICD-10-CM

## 2020-11-18 PROCEDURE — 72083 X-RAY EXAM ENTIRE SPI 4/5 VW: CPT

## 2020-11-24 ENCOUNTER — OFFICE VISIT (OUTPATIENT)
Dept: FAMILY MEDICINE CLINIC | Facility: CLINIC | Age: 20
End: 2020-11-24

## 2020-11-24 VITALS
RESPIRATION RATE: 18 BRPM | HEART RATE: 108 BPM | DIASTOLIC BLOOD PRESSURE: 70 MMHG | WEIGHT: 201.2 LBS | HEIGHT: 65 IN | BODY MASS INDEX: 33.52 KG/M2 | SYSTOLIC BLOOD PRESSURE: 118 MMHG | TEMPERATURE: 96.7 F

## 2020-11-24 DIAGNOSIS — Z23 ENCOUNTER FOR IMMUNIZATION: ICD-10-CM

## 2020-11-24 DIAGNOSIS — G89.29 CHRONIC BILATERAL LOW BACK PAIN, UNSPECIFIED WHETHER SCIATICA PRESENT: ICD-10-CM

## 2020-11-24 DIAGNOSIS — M54.50 CHRONIC BILATERAL LOW BACK PAIN, UNSPECIFIED WHETHER SCIATICA PRESENT: ICD-10-CM

## 2020-11-24 DIAGNOSIS — Z83.3 FAMILY HISTORY OF DIABETES MELLITUS (DM): ICD-10-CM

## 2020-11-24 DIAGNOSIS — Z20.822 ENCOUNTER FOR SCREENING LABORATORY TESTING FOR COVID-19 VIRUS IN ASYMPTOMATIC PATIENT: ICD-10-CM

## 2020-11-24 DIAGNOSIS — Z23 ENCOUNTER FOR ADMINISTRATION OF VACCINE: ICD-10-CM

## 2020-11-24 PROBLEM — Z11.52 ENCOUNTER FOR SCREENING LABORATORY TESTING FOR COVID-19 VIRUS IN ASYMPTOMATIC PATIENT: Status: ACTIVE | Noted: 2020-11-24

## 2020-11-24 PROBLEM — Z01.812 ENCOUNTER FOR SCREENING LABORATORY TESTING FOR COVID-19 VIRUS IN ASYMPTOMATIC PATIENT: Status: ACTIVE | Noted: 2020-11-24

## 2020-11-24 PROCEDURE — 90471 IMMUNIZATION ADMIN: CPT | Performed by: FAMILY MEDICINE

## 2020-11-24 PROCEDURE — 90686 IIV4 VACC NO PRSV 0.5 ML IM: CPT | Performed by: FAMILY MEDICINE

## 2020-11-24 PROCEDURE — 1036F TOBACCO NON-USER: CPT | Performed by: FAMILY MEDICINE

## 2020-11-24 PROCEDURE — 99213 OFFICE O/P EST LOW 20 MIN: CPT | Performed by: FAMILY MEDICINE

## 2020-11-24 PROCEDURE — 3008F BODY MASS INDEX DOCD: CPT | Performed by: FAMILY MEDICINE

## 2020-11-24 PROCEDURE — U0003 INFECTIOUS AGENT DETECTION BY NUCLEIC ACID (DNA OR RNA); SEVERE ACUTE RESPIRATORY SYNDROME CORONAVIRUS 2 (SARS-COV-2) (CORONAVIRUS DISEASE [COVID-19]), AMPLIFIED PROBE TECHNIQUE, MAKING USE OF HIGH THROUGHPUT TECHNOLOGIES AS DESCRIBED BY CMS-2020-01-R: HCPCS | Performed by: FAMILY MEDICINE

## 2020-11-26 LAB — SARS-COV-2 RNA SPEC QL NAA+PROBE: NOT DETECTED

## 2020-12-01 ENCOUNTER — TELEPHONE (OUTPATIENT)
Dept: FAMILY MEDICINE CLINIC | Facility: CLINIC | Age: 20
End: 2020-12-01

## 2021-02-18 ENCOUNTER — TELEMEDICINE (OUTPATIENT)
Dept: FAMILY MEDICINE CLINIC | Facility: CLINIC | Age: 21
End: 2021-02-18

## 2021-02-18 DIAGNOSIS — G47.00 INSOMNIA, UNSPECIFIED TYPE: Primary | ICD-10-CM

## 2021-02-18 DIAGNOSIS — F41.9 ANXIETY: ICD-10-CM

## 2021-02-18 PROCEDURE — 99213 OFFICE O/P EST LOW 20 MIN: CPT | Performed by: FAMILY MEDICINE

## 2021-02-18 RX ORDER — SAW/PYGEUM/BETA/HERB/D3/B6/ZN 30 MG-25MG
1 CAPSULE ORAL
Qty: 30 TABLET | Refills: 1 | Status: SHIPPED | OUTPATIENT
Start: 2021-02-18

## 2021-02-18 NOTE — PROGRESS NOTES
Virtual Brief Visit    Assessment/Plan:    Problem List Items Addressed This Visit        Other    Anxiety     Mild to moderate anxiety symptoms,   VINCENT-7: 6  Encourage patient to pursue new hobbies that may interest him  Patient to follow with Dr Miguel for CBT         Insomnia - Primary     Patient having occasional issues with falling and staying asleep  Anxiety likely contributing to his symptoms  Will prescribe ER melatonin 10mg   Encourage patient to follow up with Dr Miguel for CBT         Relevant Medications    Melatonin ER 10 MG TBCR        Patient to complete blood work from October of 2020        Reason for visit is   Chief Complaint   Patient presents with    Virtual Brief Visit        Encounter provider Eugene Hernandez MD    Provider located at [address] [place] [id number] [phone]    Recent Visits  No visits were found meeting these conditions  Showing recent visits within past 7 days and meeting all other requirements     Today's Visits  Date Type Provider Dept   02/18/21 Telemedicine Eugene Hernandez MD [facility] today's visits and meeting all other requirements     Future Appointments  No visits were found meeting these conditions  Showing future appointments within next 150 days and meeting all other requirements        After connecting through telephone, the patient was identified by name and date of birth  Lissette Holden was informed that this is a telemedicine visit and that the visit is being conducted through telephone  My office door was closed  No one else was in the room  He acknowledged consent and understanding of privacy and security of the platform  The patient has agreed to participate and understands he can discontinue the visit at any time  Patient is aware this is a billable service       It was my intent to perform this visit via video technology but the patient was not able to do a video connection so the visit was completed via audio telephone only  Subjective    Letta Merlin is a 23 y o  male presents via telephone for virtual visit  HPI   Patient presents for follow up regarding anxiety and difficulty with sleep  Patient has had anxiety in the past but has noted more difficult falling and staying asleep  Patient specifically some anxiety regarding work which lead to nights were he had difficulty sleeping  Patient has an irregular sleep schedule, keep his TV on and sleep in a warm room  Patient states there is also come anxiety regarding COVID-19  Patient has seen Noam Jacinto in the past and would like to start seeing her again  Patient reports having trouble with finding passion about his hobbies  Patient also mentioned that "he doesn't like living in a [employer]"  VINCENT-7 Flowsheet Screening      Most Recent Value   Over the last two weeks, how often have you been bothered by the following problems? Feeling nervous, anxious, or on edge  1   Not being able to stop or control worrying  1   Worrying too much about different things  1   Trouble relaxing   1   Being so restless that it's hard to sit still  0   Becoming easily annoyed or irritable   1   Feeling afraid as if something awful might happen  0   How difficult have these problems made it for you to do your work, take care of things at home, or get along with other people? Somewhat difficult   VINCENT Score   5            No past medical history on file  No past surgical history on file  Current Outpatient Medications   Medication Sig Dispense Refill    diphenhydrAMINE (BENADRYL) 50 mg capsule Take 1 capsule (50 mg total) by mouth every 6 (six) hours as needed for itching or allergies (Patient not taking: Reported on 5/10/2019) 30 capsule 0    Melatonin ER 10 MG TBCR Take 1 tablet (10 mg total) by mouth daily at bedtime 30 tablet 1     No current facility-administered medications for this visit  No Known Allergies    Review of Systems   As noted above    VIRTUAL VISIT [address] acknowledges that he has consented to an online visit or consultation  He understands that the online visit is based solely on information provided by him, and that, in the absence of a face-to-face physical evaluation by the physician, the diagnosis he receives is both limited and provisional in terms of accuracy and completeness  This is not intended to replace a full medical face-to-face evaluation by the physician  Ramos Harrington understands and accepts these terms  ROSIE Ahmadi  Family Medicine, PGY-3    Please excuse any "sound-alike" errors that may have ocurred during the process of dictation  Parts of this note have been dictated and there may be errors present in the transcription process  Thank you

## 2021-02-18 NOTE — ASSESSMENT & PLAN NOTE
Patient having occasional issues with falling and staying asleep  Anxiety likely contributing to his symptoms  Will prescribe ER melatonin 10mg   Encourage patient to follow up with Dr Miguel for CBT

## 2021-02-18 NOTE — ASSESSMENT & PLAN NOTE
Mild to moderate anxiety symptoms,   VINCENT-7: 6  Encourage patient to pursue new hobbies that may interest him  Patient to follow with Dr Miguel for CBT

## 2021-04-08 ENCOUNTER — OFFICE VISIT (OUTPATIENT)
Dept: FAMILY MEDICINE CLINIC | Facility: CLINIC | Age: 21
End: 2021-04-08

## 2021-04-08 VITALS
HEART RATE: 90 BPM | DIASTOLIC BLOOD PRESSURE: 70 MMHG | HEIGHT: 65 IN | TEMPERATURE: 98 F | SYSTOLIC BLOOD PRESSURE: 110 MMHG | BODY MASS INDEX: 34.85 KG/M2 | OXYGEN SATURATION: 97 % | WEIGHT: 209.2 LBS | RESPIRATION RATE: 18 BRPM

## 2021-04-08 DIAGNOSIS — Z23 ENCOUNTER FOR IMMUNIZATION: ICD-10-CM

## 2021-04-08 DIAGNOSIS — M79.601 PAIN OF RIGHT UPPER EXTREMITY: Primary | ICD-10-CM
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
## 2021-04-08 PROCEDURE — 99213 OFFICE O/P EST LOW 20 MIN: CPT | Performed by: FAMILY MEDICINE

## 2021-04-08 PROCEDURE — 3008F BODY MASS INDEX DOCD: CPT | Performed by: FAMILY MEDICINE

## 2021-04-08 PROCEDURE — 1036F TOBACCO NON-USER: CPT | Performed by: FAMILY MEDICINE

## 2021-04-08 NOTE — ASSESSMENT & PLAN NOTE
Dog bite 2 days ago with purpura and bruising but no breakage in skin or puncture wounds  No fevers chills nausea vomiting or concerns for infection  Would like to have given tetanus booster today however patient is getting 1st dose of COVID vaccine next week so will hold off  Advised patient to get 1st and 2nd dose of COVID vaccine then come back for nurse visit after that for TD booster  He will find out more patient dogs vaccinations status and follow-up  Advised if dog has not been vaccinated for rabies him to come back for rabies vaccine  And he should get that before his COVID vaccine  Low suspicion for infection at this point but precautions were discussed

## 2021-04-08 NOTE — PROGRESS NOTES
Assessment/Plan:    Dog bite    Dog bite 2 days ago with purpura and bruising but no breakage in skin or puncture wounds  No fevers chills nausea vomiting or concerns for infection  Would like to have given tetanus booster today however patient is getting 1st dose of COVID vaccine next week so will hold off  Advised patient to get 1st and 2nd dose of COVID vaccine then come back for nurse visit after that for TD booster  He will find out more patient dogs vaccinations status and follow-up  Advised if dog has not been vaccinated for rabies him to come back for rabies vaccine  And he should get that before his COVID vaccine  Low suspicion for infection at this point but precautions were discussed  Problem List Items Addressed This Visit        Other    Dog bite       Dog bite 2 days ago with purpura and bruising but no breakage in skin or puncture wounds  No fevers chills nausea vomiting or concerns for infection  Would like to have given tetanus booster today however patient is getting 1st dose of COVID vaccine next week so will hold off  Advised patient to get 1st and 2nd dose of COVID vaccine then come back for nurse visit after that for TD booster  He will find out more patient dogs vaccinations status and follow-up  Advised if dog has not been vaccinated for rabies him to come back for rabies vaccine  And he should get that before his COVID vaccine  Low suspicion for infection at this point but precautions were discussed  Other Visit Diagnoses     Encounter for immunization    -  Primary            Subjective:      Patient ID: Arron Parks is a 23 y o  male  Present clinic for dog bite on right arm  Was bit 2 days ago at friend's house  Denies any bleeding drainage redness or severe pain from arm  No nausea vomiting or fevers  He is unsure when the last time he had tetanus vaccine  He is also unsure the dog's vaccination status  The dog is house dog and was not wild    No issues with strength or sensation in arm  he is getting 1st dose of COVID vaccine next week  The following portions of the patient's history were reviewed and updated as appropriate: allergies, current medications, past family history, past medical history, past social history, past surgical history and problem list     Review of Systems   Constitutional: Negative for chills and fever  HENT: Negative for congestion, rhinorrhea and sore throat  Respiratory: Negative for cough and shortness of breath  Cardiovascular: Negative for chest pain and palpitations  Gastrointestinal: Negative for abdominal pain, diarrhea, nausea and vomiting  Musculoskeletal: Negative for myalgias  Skin: Positive for color change and wound  Neurological: Negative for headaches  All other systems reviewed and are negative  Objective:      /70 (BP Location: Left arm, Patient Position: Sitting, Cuff Size: Standard)   Pulse 90   Temp 98 °F (36 7 °C) (Tympanic)   Resp 18   Ht 5' 5" (1 651 m)   Wt 94 9 kg (209 lb 3 2 oz)   SpO2 97%   BMI 34 81 kg/m²          Physical Exam  Vitals signs and nursing note reviewed  Constitutional:       General: He is not in acute distress  Appearance: Normal appearance  He is not ill-appearing, toxic-appearing or diaphoretic  Cardiovascular:      Rate and Rhythm: Normal rate and regular rhythm  Pulses: Normal pulses  Heart sounds: Normal heart sounds  Musculoskeletal:      Comments:   Large area of purpura and yellow bruising on ventral surface of right arm  No puncture wounds just   Minimalsuperficial scratches  No discharge or bleeding or areas of fluctuation  Neurological:      General: No focal deficit present  Mental Status: He is alert and oriented to person, place, and time

## 2021-10-16 ENCOUNTER — HOSPITAL ENCOUNTER (EMERGENCY)
Facility: HOSPITAL | Age: 21
Discharge: HOME | End: 2021-10-16
Attending: EMERGENCY MEDICINE | Admitting: EMERGENCY MEDICINE
Payer: COMMERCIAL

## 2021-10-16 VITALS
SYSTOLIC BLOOD PRESSURE: 145 MMHG | DIASTOLIC BLOOD PRESSURE: 100 MMHG | HEART RATE: 92 BPM | OXYGEN SATURATION: 100 % | TEMPERATURE: 98.8 F | RESPIRATION RATE: 18 BRPM

## 2021-10-16 DIAGNOSIS — R10.9 ABDOMINAL PAIN: Primary | ICD-10-CM

## 2021-10-16 PROCEDURE — 99282 EMERGENCY DEPT VISIT SF MDM: CPT | Performed by: EMERGENCY MEDICINE

## 2021-10-16 PROCEDURE — 99283 EMERGENCY DEPT VISIT LOW MDM: CPT

## 2022-01-10 PROCEDURE — U0003 INFECTIOUS AGENT DETECTION BY NUCLEIC ACID (DNA OR RNA); SEVERE ACUTE RESPIRATORY SYNDROME CORONAVIRUS 2 (SARS-COV-2) (CORONAVIRUS DISEASE [COVID-19]), AMPLIFIED PROBE TECHNIQUE, MAKING USE OF HIGH THROUGHPUT TECHNOLOGIES AS DESCRIBED BY CMS-2020-01-R: HCPCS | Performed by: FAMILY MEDICINE

## 2022-01-10 PROCEDURE — U0005 INFEC AGEN DETEC AMPLI PROBE: HCPCS | Performed by: FAMILY MEDICINE

## 2022-02-07 ENCOUNTER — OFFICE VISIT (OUTPATIENT)
Dept: FAMILY MEDICINE CLINIC | Facility: CLINIC | Age: 22
End: 2022-02-07

## 2022-02-07 VITALS
HEIGHT: 64 IN | WEIGHT: 200 LBS | DIASTOLIC BLOOD PRESSURE: 76 MMHG | HEART RATE: 83 BPM | BODY MASS INDEX: 34.15 KG/M2 | RESPIRATION RATE: 21 BRPM | TEMPERATURE: 98.5 F | OXYGEN SATURATION: 98 % | SYSTOLIC BLOOD PRESSURE: 115 MMHG

## 2022-02-07 DIAGNOSIS — F41.9 ANXIETY: ICD-10-CM

## 2022-02-07 DIAGNOSIS — Z00.00 ANNUAL PHYSICAL EXAM: Primary | ICD-10-CM

## 2022-02-07 DIAGNOSIS — G47.00 INSOMNIA, UNSPECIFIED TYPE: ICD-10-CM

## 2022-02-07 PROCEDURE — 1036F TOBACCO NON-USER: CPT | Performed by: FAMILY MEDICINE

## 2022-02-07 PROCEDURE — 3008F BODY MASS INDEX DOCD: CPT | Performed by: FAMILY MEDICINE

## 2022-02-07 PROCEDURE — 99395 PREV VISIT EST AGE 18-39: CPT | Performed by: FAMILY MEDICINE

## 2022-02-07 NOTE — ASSESSMENT & PLAN NOTE
- Patient complains of trouble falling asleep and feeling fatigued upon awakening  He also reports loud snoring  Patient scored 3 points on STOP BANG assessment  High risk of OCHOA  -  Etiology of insomnia could be either due to anxiety vs  OCHOA  - Will refer to sleep medicine for evaluation for OCHOA and referral to  for outpatient therapy

## 2022-02-07 NOTE — ASSESSMENT & PLAN NOTE
- Worsening  - Patient complains of situational anxiety surrounding work which he feels may be contributing to issues falling asleep  He reports racing thoughts and palpitations when attempting to fall asleep   -will provide referral to   to help with outpatient therapy  For CBT  -  I believe patient will benefit from meeting with a therapist   If symptoms of anxiety are not improving with outpatient therapy, will consider medication

## 2022-02-07 NOTE — PROGRESS NOTES
[address] [facility]    NAME: Gloria Martell  AGE: 19 y o  SEX: male  : 2000     DATE: 2022     Assessment and Plan:     Problem List Items Addressed This Visit        Other    Anxiety     - Worsening  - Patient complains of situational anxiety surrounding work which he feels may be contributing to issues falling asleep  He reports racing thoughts and palpitations when attempting to fall asleep   -will provide referral to   to help with outpatient therapy  For CBT  -  I believe patient will benefit from meeting with a therapist   If symptoms of anxiety are not improving with outpatient therapy, will consider medication  Relevant Orders    Ambulatory Referral to Social Work Care Management Program    Annual physical exam - Primary     -  Patient presents to clinic for annual physical exam    -  Ordered CMP and lipid panel to screen for cardiovascular disease    -  No  cancer related screening indicated at this time  -  Immunizations up-to-date including COVID and  Influenza  - Counseled the patient on healthy lifestyle habits         Relevant Orders    Comprehensive metabolic panel    Lipid panel    HIV 1/2 Antigen/Antibody (4th Generation) w Reflex [facility]    Insomnia     - Patient complains of trouble falling asleep and feeling fatigued upon awakening  He also reports loud snoring  Patient scored 3 points on STOP BANG assessment  High risk of OCHOA  -  Etiology of insomnia could be either due to anxiety vs  OCHOA  - Will refer to sleep medicine for evaluation for OCHOA and referral to  for outpatient therapy  Relevant Orders    Home Study          Immunizations and preventive care screenings were discussed with patient today  Appropriate education was printed on patient's after visit summary      Counseling:  Alcohol/drug use: discussed moderation in alcohol intake, the recommendations for healthy alcohol use, and avoidance of illicit drug use  Dental Health: discussed importance of regular tooth brushing, flossing, and dental visits  Injury prevention: discussed safety/seat belts, safety helmets, smoke detectors, carbon dioxide detectors, and smoking near bedding or upholstery  Sexual health: discussed sexually transmitted diseases, partner selection, use of condoms, avoidance of unintended pregnancy, and contraceptive alternatives  · Exercise: the importance of regular exercise/physical activity was discussed  Recommend exercise 3-5 times per week for at least 30 minutes  Return in about 4 weeks (around 3/7/2022) for F/U Anxiety   Chief Complaint:     Chief Complaint   Patient presents with    Annual Exam     No complaint today        History of Present Illness:     Adult Annual Physical   Patient here for a comprehensive physical exam  The patient reports problems - anxiety and insomnia  Patient complains of dull chest pain last night in center of chest when falling asleep  Felt once and then again a couple minutes later  It later resolved on its own  Denies palpitations or lightheadedness during these episodes  Patient also complains of anxiety  He reports situational anxiety in regards to returning to work  He reports palpitations and racing thoughts when attempting to fall asleep  Diet and Physical Activity  · Diet/Nutrition: poor diet, frequent junk food and limited fruits/vegetables  · Exercise: no formal exercise  Depression Screening  PHQ-2/9 Depression Screening    Little interest or pleasure in doing things: 0 - not at all  Feeling down, depressed, or hopeless: 0 - not at all  PHQ-2 Score: 0  PHQ-2 Interpretation: Negative depression screen       General Health  · Sleep: sleeps poorly, gets 4-6 hours of sleep on average, snores loudly and unrefreshing sleep  Difficulties with falling asleep   Intermittent melatonin use with little relief of symptoms  Attempted to cut back on caffeinated beverages but no improvement in sleep  · Hearing: normal - bilateral   · Vision: no vision problems  · Dental: regular dental visits, brushes teeth twice daily and does not floss   Health  · History of STDs?: no       Review of Systems:     Review of Systems   Constitutional: Negative  Negative for appetite change, chills and fever  HENT: Negative for congestion and sore throat  Respiratory: Negative for cough and shortness of breath  Cardiovascular: Negative for chest pain  Gastrointestinal: Negative for abdominal pain, constipation, diarrhea, nausea and vomiting  Genitourinary: Negative for difficulty urinating, frequency, hematuria and urgency  Musculoskeletal: Negative for arthralgias and myalgias  Neurological: Negative for light-headedness and headaches  All other systems reviewed and are negative  Past Medical History:     History reviewed  No pertinent past medical history  Past Surgical History:     History reviewed  No pertinent surgical history     Social History:     Social History     Socioeconomic History    Marital status: Single     Spouse name: None    Number of children: 0    Years of education: None    Highest education level: None   Occupational History    None   Tobacco Use    Smoking status: Never Smoker    Smokeless tobacco: Never Used   Vaping Use    Vaping Use: Never used   Substance and Sexual Activity    Alcohol use: Never    Drug use: Never    Sexual activity: Yes     Partners: Female   Other Topics Concern    None   Social History Narrative    None     Social Determinants of Health     Financial Resource Strain: Low Risk     Difficulty of Paying Living Expenses: Not hard at all   Food Insecurity: No Food Insecurity    Worried About Running Out of Food in the Last Year: Never true    Poornima of Food in the Last Year: Never true   Transportation Needs: No Transportation Needs    Lack of Transportation (Medical): No    Lack of Transportation (Non-Medical): No   Physical Activity: Inactive    Days of Exercise per Week: 0 days    Minutes of Exercise per Session: 0 min   Stress: No Stress Concern Present    Feeling of Stress : Not at all   Social Connections: Socially Isolated    Frequency of Communication with Friends and Family: More than three times a week    Frequency of Social Gatherings with Friends and Family: More than three times a week    Attends Advent Services: Never    Active Member of Clubs or Organizations: No    Attends Club or Organization Meetings: Never    Marital Status: Never    Intimate Partner Violence: Not At Risk    Fear of Current or Ex-Partner: No    Emotionally Abused: No    Physically Abused: No    Sexually Abused: No   Housing Stability: Low Risk     Unable to Pay for Housing in the Last Year: No    Number of [place] in the Last Year: 1    Unstable Housing in the Last Year: No      Family History:     Family History   Problem Relation Age of Onset    No Known Problems Mother     No Known Problems Father     No Known Problems Sister     No Known Problems Brother       Current Medications:     Current Outpatient Medications   Medication Sig Dispense Refill    diphenhydrAMINE (BENADRYL) 50 mg capsule Take 1 capsule (50 mg total) by mouth every 6 (six) hours as needed for itching or allergies (Patient not taking: Reported on 2/7/2022 ) 30 capsule 0    Melatonin ER 10 MG TBCR Take 1 tablet (10 mg total) by mouth daily at bedtime (Patient not taking: Reported on 2/7/2022 ) 30 tablet 1     No current facility-administered medications for this visit        Allergies:     No Known Allergies   Physical Exam:     /76 (BP Location: Right arm, Patient Position: Sitting, Cuff Size: Large)   Pulse 83   Temp 98 5 °F (36 9 °C) (Temporal)   Resp 21   Ht 5' 4 3" (1 633 m)   Wt 90 7 kg (200 lb)   SpO2 98%   BMI 34 01 kg/m²     Physical Exam  Vitals and nursing note reviewed  Constitutional:       General: He is not in acute distress  Appearance: Normal appearance  He is obese  He is not ill-appearing, toxic-appearing or diaphoretic  HENT:      Head: Normocephalic and atraumatic  Eyes:      Conjunctiva/sclera: Conjunctivae normal    Cardiovascular:      Rate and Rhythm: Normal rate and regular rhythm  Pulses: Normal pulses  Heart sounds: Normal heart sounds  Pulmonary:      Effort: Pulmonary effort is normal       Breath sounds: Normal breath sounds  Abdominal:      General: Abdomen is flat  Bowel sounds are normal       Palpations: Abdomen is soft  Musculoskeletal:      Right lower leg: No edema  Left lower leg: No edema  Skin:     General: Skin is warm and dry  Neurological:      Mental Status: He is alert  Mental status is at baseline  Psychiatric:         Mood and Affect: Mood normal          Behavior: Behavior normal          Thought Content:  Thought content normal          Judgment: Judgment normal           Rusty Coates MD   [address]

## 2022-02-07 NOTE — PATIENT INSTRUCTIONS

## 2022-02-07 NOTE — ASSESSMENT & PLAN NOTE
-  Patient presents to clinic for annual physical exam    -  Ordered CMP and lipid panel to screen for cardiovascular disease    -  No  cancer related screening indicated at this time  -  Immunizations up-to-date including COVID and  Influenza    - Counseled the patient on healthy lifestyle habits

## 2022-02-08 ENCOUNTER — TELEPHONE (OUTPATIENT)
Dept: PSYCHIATRY | Facility: CLINIC | Age: 22
End: 2022-02-08

## 2022-02-08 ENCOUNTER — PATIENT OUTREACH (OUTPATIENT)
Dept: FAMILY MEDICINE CLINIC | Facility: CLINIC | Age: 22
End: 2022-02-08

## 2022-02-08 NOTE — TELEPHONE ENCOUNTER
----- Message from ABDULAZIZ Wu sent at 2/8/2022  3:20 PM EST -----  Hello, this patient would like to be placed on your waiting list, please contact him when you are able  Thank you, Desmond Estes

## 2022-02-08 NOTE — TELEPHONE ENCOUNTER
Behavorial Health Outpatient Intake Questions    Referred by:Dr Eris Nava, [facility]  Please advised interviewee that they need to answer all questions truthfully to allow for best care and any misrepresentations of information may affect their ability to be seen at this clinic   => Was this discussed? Yes     [facility]     Presenting Problem (in patient's words): Anxiety and emotional stressAre there any developmental disabilities? ? If yes, can they speak to you on the phone? If they are too limited to speak to you on phone, refer out No    Are you taking any psychiatric medications? No    => If yes, who prescribes? If yes, are they injectable medications? Does the patient have a language barrier or hearing impairment? No    Have you been treated at [facility] by a therapist or a doctor in the past? If yes, who? No    Has the patient been hospitalized for mental health? No   If yes, how long ago was last hospitalization and where was it? Do you actively use alcohol or marijuana or illegal substances? If yes, what and how much - refer out to Drug and alcohol treatment if use is excessive or daily use of illegal substances No concerns of substance abuse are reported  Do you have a community treatment team or ? No    Legal History-     Does the patient have any history of arrests, FDC/shelter time, or DUIs? No  If Yes-  1) What types of charges? 2) When were they last incarcerated? 3) Are they currently on parole or probation? Minor Child-    Who has custody of the child? Is there a custody agreement? If there is a custody agreement remind parent that they must bring a copy to the first appt or they will not be seen       Intake Team, please check with provider before scheduling if flags come up such as:  - complex case  - legal history (other than DUI)  - communication barrier concerns are present  - if, in your judgment, this needs further review    ACCEPTED as a patient Yes  => Appointment Date: 02/22/2022 @3:00 debi/ Shelli Bean    Referred Elsewhere? No    Name of Zack Ron PM#[id number]  Insurance Phone #  If ins is primary or secondary  If patient is a minor, parents information such as Name, D  O B of guarantor

## 2022-02-08 NOTE — PROGRESS NOTES
Received request to contact patient to provide support regarding outpatient mental health treatment  Call to patient regarding same  Patient reports that he has been experiencing some anxiety and is interested in becoming involved with mental health therapy to address this issue  Patient reports he is working and resides with his parents  He reports that he does not drive but has easy access to transportation  Reviewed [employer] Website to locate local in network mental health providers and information provided to patient  Patient is requesting a referral to [address] [place] [address] as well and referral has been made  Patient states he is able to call to schedule an appointment and will be able to access transportation to the appointment  Supportive counseling provided to patient who denies further needs at this time  Will continue to be available to provide support

## 2022-02-22 ENCOUNTER — SOCIAL WORK (OUTPATIENT)
Dept: BEHAVIORAL/MENTAL HEALTH CLINIC | Facility: CLINIC | Age: 22
End: 2022-02-22
Payer: COMMERCIAL

## 2022-02-22 DIAGNOSIS — F41.9 ANXIETY: Primary | ICD-10-CM

## 2022-02-22 PROCEDURE — 90791 PSYCH DIAGNOSTIC EVALUATION: CPT | Performed by: COUNSELOR

## 2022-02-22 NOTE — PSYCH
Assessment/Plan: Treatment plan will be reviewed and discussed during the next session  Diagnoses and all orders for this visit:    Anxiety          Subjective:      Patient ID: Hellen Keller is a 19 y o  male  HPI:     Pre-morbid level of function and History of Present Illness: Gibran Elmore reports, "Stress, anxiety, anger, annoyance and lack of sleep " Gibran Elmore reports a lack of sleep onset over one year  He reports that he can be tired and still unable to fall asleep  He reports that when he lays down for bed "My heart just races " Gibran Elmore reports that he has always been an anxious person  Gibran Elmore reports that he noticed an increase in anxiety symptoms during high school with an increase in symptoms throughout the years  Gibran Elmore reports the following triggers: "work , most social situations, I get along with most people, theater " Gibran Elmore reports, "I don't now if what I'm feeling valid and I overreacting and do I have a right to feel this way " Gibran Elmore reports that he questions himself with his friendships and if he is overacting  Previous Psychiatric/psychological treatment/year: Gibran Elmore has no hx of psychological tx  Current Psychiatrist/Therapist: no psychiatrist/ Genaro Larry Fitting, LCSW   Outpatient and/or Partial and Other Community Resources Used (CTT, ICM, VNA): none reported       Problem Assessment:     SOCIAL/VOCATION:  Family Constellation (include parents, relationship with each and pertinent Psych/Medical History):     Family History   Problem Relation Age of Onset    No Known Problems Mother     No Known Problems Father     No Known Problems Sister     No Known Problems Brother        Mother: Gibran Elmore reports that currently him and his mother have had arguments about: moving out of the state, his ability to express his thoughts and feelings to his mother  Gibran Elmore reports, "Sometimes I feel she feels she needs to pick between me and him "      Step Father: Misael's step father has been in his life over 10 years   "We get along and we have our disagreements "      Spouse: None reported      Father: Lynette Brooke reports that he does not have a relationship with his biological father  Lynette Brooke reports that he avoids father's side of the family due to "negativety and it's easy for them to remember what's wrong and they always seem to be in a bad mood " Last time he seen his father was New Years 54  Children: None reported     Sibling: Lynette Brooke has six younger siblings on his father's side of the family  He reports that he does not have a relationship with his younger siblings, "I have not seen them in years "       Lynette Brooke relates best to "My mom and two or three close friendsI can confide in  he lives with mother and step father  he does not live alone  Domestic Violence: No past history of domestic violence and There is no history of child abuse    Additional Comments related to family/relationships/peer support: no additional information reported  School or Work History (strengths/limitations/needs): Lynette Brooke is employed at [employer]  Lynette Colineman reports that he enjoys working at [employer]  Flakitamarcos Brooke reports that he has nothing to worry about but when he has to work the night before he struggles falling asleep  He reports that if he has off from work the night before he is able to sleep  Lynette Brooke reports, "I hate working and I am a very politically inclined and I have no care for it " Lynette Brooke is enrolled at [employer]  and majoring in theater  Lynette Colineman reports school has also been a stressors due to finances  Flakitamarcos Colineman reports that he was enrolled at [employer] prior to leaving his final semester due to his inability to afford his last semester   Lynette Brooke reports that his strengths: "I don't know, I never really felt I had strengths " Limitations: " not caring " Lynette Brooke reports, "I understand the value of it but I don't have the passion for it "           Her highest grade level achieved was high school      history includes N/A    Financial status includes Lynette Brooke reports concerns in regards to education finances and his family finances     LEISURE ASSESSMENT (Include past and present hobbies/interests and level of involvement (Ex: Group/Club Affiliations): Theater, watch movies, video games, watch T V , try to read more and honestly I like to be around my friends  his primary language is [place]  Preferred language is [place]  Ethnic considerations are none reported  Religions affiliations and level of involvement none reported Atheist   Does spirituality help you cope? No    FUNCTIONAL STATUS: There has been a recent change in Leah ability to do the following: None reported     Level of Assistance Needed/By Whom?: N/A    Leah learns best by  reading, listening, demonstration and picture    SUBSTANCE ABUSE ASSESSMENT: no substance abuse    Substance/Route/Age/Amount/Frequency/Last Use: N/A    DETOX HISTORY: N/A    Previous detox/rehab treatment: N/A    HEALTH ASSESSMENT: no referral to PCP needed    LEGAL: none reported     Prenatal History: N/A    Delivery History: born by  section    Developmental Milestones: within normal limits   Temperament as an infant was unknown   Temperament as a toddler was unknown  Temperament at school age was unknown   Temperament as a teenager was irritable      Risk Assessment:   The following ratings are based on my interview(s) with Misael    Risk of Harm to Self:   Demographic risk factors include  and age: young adult (15-24)  Historical Risk Factors include none reported   Recent Specific Risk Factors include worries about finances or work      Risk of Harm to Others:   Demographic Risk Factors include male  Historical Risk Factors include none reported   Recent Specific Risk Factors include none reported     Access to Weapons:   Zacarias Delgado has access to the following weapons: Step father owns knives and blades   The following steps have been taken to ensure weapons are properly secured: Charleen John reports that he is unaware of where the weapons are kept in  home       Based on the above information, the client presents the following risk of harm to self or others:  low    The following interventions are recommended:   no intervention changes    Notes regarding this Risk Assessment: Zacarias Delgado denies active SI, intent or plan         Review Of Systems:     Mood Normal   Behavior Normal    Thought Content Normal   General Normal    Personality Normal   Other Psych Symptoms Normal   Constitutional Normal   ENT Normal   Cardiovascular Normal    Respiratory Normal    Gastrointestinal Normal   Genitourinary Normal    Musculoskeletal Negative   Integumentary Normal    Neurological Normal    Endocrine Normal          Mental status:  Appearance calm and cooperative  and good eye contact    Mood mood appropriate   Affect affect appropriate    Speech a normal rate   Thought Processes normal thought processes   Hallucinations no hallucinations present    Thought Content no delusions   Abnormal Thoughts no suicidal thoughts  and no homicidal thoughts    Orientation  oriented to person, oriented to place and oriented to time   Remote Memory short term memory intact and long term memory intact   Attention Span concentration intact   Intellect Appears to be of Average Intelligence   Fund of Knowledge displays adequate knowledge of current events   Insight Insight intact   Judgement judgment was intact   Muscle Strength Muscle strength and tone were normal   Language no difficulty naming common objects and no difficulty repeating a phrase    Pain none   Pain Scale 0

## 2022-03-15 ENCOUNTER — SOCIAL WORK (OUTPATIENT)
Dept: BEHAVIORAL/MENTAL HEALTH CLINIC | Facility: CLINIC | Age: 22
End: 2022-03-15
Payer: COMMERCIAL

## 2022-03-15 DIAGNOSIS — F41.9 ANXIETY: Primary | ICD-10-CM

## 2022-03-15 PROCEDURE — 90834 PSYTX W PT 45 MINUTES: CPT | Performed by: COUNSELOR

## 2022-03-15 NOTE — BH TREATMENT PLAN
Ileana Degree  2000       Date of Initial Treatment Plan: 03/15/2022  Date of Current Treatment Plan: 03/15/22    Treatment Plan Number 1    Strengths/Personal Resources for Self Care: "pretty receptive, understanding and hardworking/ "I don't think I do a good job at that, I go for walks "      Diagnosis:   1  Anxiety         Area of Needs: "stress, anger maybe lack of confidence, over thinking, maybe not thinking enough, relationships both platonic and romantic and sleep "       Long Term Goal 1: "Exercise and get back to the gym "    Target Date: September 11, 2022  Completion Date: TBD         Short Term Objectives for Goal 1: A: "Set up an exercise routine log " B: "Go to the gym "    Long Term Goal 2: Learn positive ways to manage stress and anger     Target Date: September 11, 2022  Completion Date: TBD    Short Term Objectives for Goal 2: A: Reduce anxiety and develop coping skills B: Learn ways to communicate verbally when angry  C: Report feeling positive about self and abilities           Long Term Goal # 3: "Be able to lay down and actually fall asleep in a reasonable time "      Target Date: September 11, 2022  Completion Date: TBD    Short Term Objectives for Goal 3: A: "Limit caffine intake I've been trying to cut back on soda and drink more water " B: "Get to bed by 11:30 " C: "Try to leave a pen and paper near by and anything that stessed me out that day I can write it down before I go to bed "    GOAL 1: Modality: Individual 2x per month   Completion Date TBD and The person(s) responsible for carrying out the plan is  Opal Wilson [facility]    GOAL 2: Modality: Individual 2x per month   Completion Date TBD and The person(s) responsible for carrying out the plan [facility]  [facility]    GOAL 3: Modality: Individual 2x per month   Completion Date TBD and The person(s) responsible for carrying out the plan is  Xavi Wilson[address] [facility] Treatment Plan [facility]: Diagnosis and Treatment Plan explained to Desmond Villegas relates understanding diagnosis and is agreeable to Treatment Plan         Client Comments : Please share your thoughts, feelings, need and/or experiences regarding your treatment plan: none reported

## 2022-03-15 NOTE — PSYCH
Psychotherapy Provided: Individual Psychotherapy 60 minutes     Length of time in session: 60 minutes, follow up in 2 week    Encounter Diagnosis     ICD-10-CM    1  Anxiety  F41 9        Goals addressed in session: Goal 1, Goal 2 and Goal 3      D: Zacarias Delgado presented for his session  Treatment plan was discussed and developed  Zacarias Delgado provided details in regard to his struggles with anxiety, interpersonal skills and his anger  Zacarias Delgado reports, "I don't yell it's been something I've always tried to restrain from so I don't know how to get it out in a positive way  The therapist identified situations, thoughts and feelings that trigger anger, angry verbal and/or behavioral actions for Zacarias Delgado and the targets of those actions  The therapist worked with Zacarias Delgado on verbalizing increased awareness of anger expression patterns, their possible origins, and their consequences  Zacarias Delgado provided more details in regard to his communication style and anger in the workplace  The therapist reviewed techniques in assertive communication in an effort to reduce [employer] associated with social situations  A: Zacarias Delgado was engaged throughout the session and open about his areas of needs  Zacarias Delgado seems willing and wanting to make changes in regard to his areas of need  P: Zacarias Delgado will continue to engage in individual therapy tx will focus on work with Amada Hebert reducing symptoms of anxiety through the use of CBT and increase use of existing coping skills  Current suicide risk : Low       Behavioral Health Treatment Plan [facility]: Diagnosis and Treatment Plan explained to Chela Perez relates understanding diagnosis and is agreeable to Treatment Plan   Yes

## 2022-03-29 ENCOUNTER — SOCIAL WORK (OUTPATIENT)
Dept: BEHAVIORAL/MENTAL HEALTH CLINIC | Facility: CLINIC | Age: 22
End: 2022-03-29
Payer: COMMERCIAL

## 2022-03-29 DIAGNOSIS — F41.9 ANXIETY: Primary | ICD-10-CM

## 2022-03-29 PROCEDURE — 90834 PSYTX W PT 45 MINUTES: CPT | Performed by: COUNSELOR

## 2022-03-29 NOTE — PSYCH
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  He reports, "That's stressing me out, but that comes with the job " He continued to report his thoughts and feelings about his role and reports, "I'm just being honest and open with my actors " He also reports that he applied for a new job where his step father is employed  Jade Spencer and the therapist discussed his worries about his finances, current employment, and plans  The therapist validated [place] Setting and difficulties as understandable given Lauryn Melgoza circumstances, thoughts, and feelings  Jade Spencer and the therapist discussed his feelings about having to take a math exam he has put off for some time  The therapist worked with Jose Soriag current stressors and identified possible solutions to these problems as well as the pros and cons for each solution  The therapist aided Vishnu Art learning and implementing problem-solving strategies for realistically addressing current worries  A: Jade Spencer was engaged throughout the session and seemed more open about his needs  It seems that Jade Spencer struggles to manage his anxiety due to a hx of avoiding anxiety producing situations  It seems that if Jade Spencer limits how often he avoids anxiety producing situations it may help to decrease anxiety symptoms  P: Jade Spencer will continue to engage in individual therapy tx will focus on work with Jose Nicolas reducing symptoms of anxiety through the use of CBT and increase use of existing coping skills         Current suicide risk : Low       Behavioral Health Treatment Plan [facility]: Diagnosis and Treatment Plan explained to Henri Gillette relates understanding diagnosis and is agreeable to Treatment Plan   Yes

## 2022-04-13 ENCOUNTER — TELEPHONE (OUTPATIENT)
Dept: PSYCHIATRY | Facility: CLINIC | Age: 22
End: 2022-04-13

## 2022-04-13 NOTE — TELEPHONE ENCOUNTER
NO-SHOW LETTER MAILED TO Charli Galicia    ADDRESS: [address] [facility] [id number]

## 2022-04-13 NOTE — LETTER
22     Jhonatan Lacey   : 2000   [address] [address]       It is the policy of [facility] to monitor and manage appointments that have been no-showed or cancelled with less than 48-hour notice  This is necessary to ensure that we are able to provide timely access for all patients to our providers  Undue numbers of unutilized appointments delays necessary medical care for all patients  Dear Jhonatan Lacey {Mwparent:[id number]}: We are sorry that you missed your appointment with {Eli Providers:84481} on ***  {JWLS:79320} since your last appointment  Your health and follow-up care are important to us  {NS Next Appt:91985}    Please be aware that our office policy states that if you 'no show' two {JWTx/Therapy:11867} appointments in a row without prior notice of cancellation, or three or more in a calendar year, you may be discharged from {JWTx/Therapy:50712} treatment  We want to bring this to your attention now to prevent an interruption of your care  If you have any questions about this policy, please call us at the number above  Thank you in advance for your cooperation and assistance  Sincerely,      [address] [place] [address] Support Staff  22     Jhonatan Lacey   : 2000   [address] [address]       It is the policy of [address] [place] [address] to monitor and manage appointments that have been no-showed or cancelled with less than 48-hour notice  This is necessary to ensure that we are able to provide timely access for all patients to our providers  Undue numbers of unutilized appointments delays necessary medical care for all patients  Dear Jhonatan Lacey       We are sorry that you missed your appointment with Audie Koehler LCSW on 2022  Your health and follow-up care are important to us    We want to make you aware of your next appointment with Audie Koehler that is scheduled for Tuesday,  4/26/2022 at 2 pm     Please be aware that our office policy states that if you 'no show' two Therapy appointments in a row without prior notice of cancellation, or three or more in a calendar year, you may be discharged from Therapy treatment  We want to bring this to your attention now to prevent an interruption of your care  If you have any questions about this policy, please call us at the number above  If we do not hear from you within 10 business days to make a follow up appointment, we will assume you are no longer interested in care here  Thank you in advance for your cooperation and assistance         Sincerely,      [address] [facility] [address] Support Staff

## 2022-04-13 NOTE — LETTER
22     Taqueria Pederson   : 2000   [address] [address]       It is the policy of [facility] to monitor and manage appointments that have been no-showed or cancelled with less than 48-hour notice  This is necessary to ensure that we are able to provide timely access for all patients to our providers  Undue numbers of unutilized appointments delays necessary medical care for all patients  Dear Taqueria Pederson      We are sorry that you missed your appointment with Jone Ji LCSW on 2022  Your health and follow-up care are important to us  We want to make you aware of your next appointment with  that is scheduled for Tuesday,  2022 at 2 pm     Please be aware that our office policy states that if you 'no show' two Therapy appointments in a row without prior notice of cancellation, or three or more in a calendar year, you may be discharged from Therapy treatment  We want to bring this to your attention now to prevent an interruption of your care  If you have any questions about this policy, please call us at the number above  Thank you in advance for your cooperation and assistance         Sincerely,      [address] [facility] [address] Support Staff

## 2022-04-26 ENCOUNTER — SOCIAL WORK (OUTPATIENT)
Dept: BEHAVIORAL/MENTAL HEALTH CLINIC | Facility: CLINIC | Age: 22
End: 2022-04-26
Payer: COMMERCIAL

## 2022-04-26 DIAGNOSIS — F41.9 ANXIETY: Primary | ICD-10-CM

## 2022-04-26 PROCEDURE — 90834 PSYTX W PT 45 MINUTES: CPT | Performed by: COUNSELOR

## 2022-04-26 NOTE — PSYCH
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  Shaka Jovelna reports, "I'm still studying, I want to get a hold of my friend she's a math quiz " The therapist and Shaka Jovelna discussed his avoidance of anxiety producing situations  Shaka Coles and the therapist discussed his employment and new employment  Shaka Coles reports, "I'm a little nervous " The therapist assisted Brandin Alatorre processing thoughts and feelings surrounding recent events  The therapist worked with Alexandria Side current stressors and identified possible solutions to these problems as well as the pros and cons for each solution  A: Shaka Coles was engaged throughout the session  It seems that Shaka Coles continues to struggles to manage his anxiety due to a hx of avoiding anxiety producing situations  It seems that if Shaka Coles limits how often he avoids anxiety producing situations it may help to decrease anxiety symptoms  P: Shaka Coles will continue to engage in individual therapy tx will focus on work with Misael  on reducing symptoms of anxiety through the use of CBT and increase use of existing coping skills  Current suicide risk : Low       Behavioral Health Treatment Plan [facility]: Diagnosis and Treatment Plan explained to Sangeetha Judge relates understanding diagnosis and is agreeable to Treatment Plan   Yes

## 2022-05-23 ENCOUNTER — SOCIAL WORK (OUTPATIENT)
Dept: BEHAVIORAL/MENTAL HEALTH CLINIC | Facility: CLINIC | Age: 22
End: 2022-05-23
Payer: COMMERCIAL

## 2022-05-23 DIAGNOSIS — F41.9 ANXIETY: Primary | ICD-10-CM

## 2022-05-23 PROCEDURE — 90834 PSYTX W PT 45 MINUTES: CPT | Performed by: COUNSELOR

## 2022-05-23 NOTE — PSYCH
Psychotherapy Provided: Individual Psychotherapy 45 minutes     Length of time in session: 45 minutes, follow up in 3 week    Encounter Diagnosis     ICD-10-CM    1  Anxiety  F41 9        Goals addressed in session: Goal 1, Goal 2 and Goal 3      D: Diane Mai presents for his follow up session, therapist and Diane Mai review the last session  The therapist administer the PHQ-9 and Diane Mai reports his feeling down in the past two two weeks  Diane Mai reports, "A lot of things on my mind I feel I"ve dealt with and feeling better " The therapist and Diane Mai continue to discuss his friendships and communication  The therapist and Diane Mai discuss his new employment, scheduling his math exam for next semester and his anxiety  Diane Mai provides details of his relationship with his mother and how some of their interactions have contribute to his anxiety symptoms  The therapist aided Brittany Martin learning and implementing problem-solving strategies for realistically addressing current worries  The therapist reviewed effective communication skills with Garry Fraser goal of expressing feelings and needs appropriately to his mother  A: Diane Mai was engaged throughout the session  Diane Mai seems to be working towards not avoiding anxiety producing situations since the last session  It seems that if Diane Mai limits how often he avoids anxiety producing situations it may help to decrease anxiety symptoms      P: Diane Mai will continue to engage in individual therapy tx will focus on work with Misael  on reducing symptoms of anxiety through the use of CBT and increase use of existing coping skills  Current suicide risk : Low         Behavioral Health Treatment Plan [facility]: Diagnosis and Treatment Plan explained to Russ Kim relates understanding diagnosis and is agreeable to Treatment Plan   Yes

## 2022-06-10 ENCOUNTER — TELEPHONE (OUTPATIENT)
Dept: BEHAVIORAL/MENTAL HEALTH CLINIC | Facility: CLINIC | Age: 22
End: 2022-06-10

## 2022-08-01 ENCOUNTER — SOCIAL WORK (OUTPATIENT)
Dept: BEHAVIORAL/MENTAL HEALTH CLINIC | Facility: CLINIC | Age: 22
End: 2022-08-01
Payer: COMMERCIAL

## 2022-08-01 DIAGNOSIS — F41.9 ANXIETY: Primary | ICD-10-CM

## 2022-08-01 PROCEDURE — 90834 PSYTX W PT 45 MINUTES: CPT | Performed by: COUNSELOR

## 2022-08-01 NOTE — PSYCH
Psychotherapy Provided: Individual Psychotherapy 45 minutes     Length of time in session: 45 minutes, follow up in 1 month    Encounter Diagnosis     ICD-10-CM    1  Anxiety  F41 9        Goals addressed in session: Goal 1, Goal 2 and Goal 3      D: Omar Mcmahan presents for his session and reports "New job is going good, I gave [employer] my two week notice " Omar Mcmahan continues to provide details about his new employment and leaving his former place of employment  Omar Mcmahan reports how his new employment has aided in him getting to bed earlier, "I still struggle falling asleep " The therapist and Omar Mcmahan review healthy sleep hygiene  Omar Mcmahan provides an update in regard to completing his final course at [employer] [facility] [employer] and paying off his loan at [employer]  Omar Mcmahan reports the decrease in stress due to his decision to take a semester off and paying off his loan at [employer] [facility]  The therapist and Omar Mcmahan continues to discuss his anxiety symptoms  Omar Mcmahan reports, "There's always this looming feeling " The therapist works with Blas Rm of coping and mindfulness skills to deal with current stressors  Omar Mcmahan provides details in regard to his recent vacation with his friends, conflicts with his friends while on vacation and conflicts once they returned home  The therapist reviews effective communication skills with Dawson Tanner goal of expressing feelings and needs appropriately  The therapist focuse on the concepts of thoughts, feelings, and behaviors to examine problematic relationship habits  Discussed ways for Omar Mcmahan  to recognize patterns, understand automatic reactions, and offer strategies to encourage change in these habits  A: Omar Mcmahan was engaged throughout the session and continues to be goal oriented  Omar Mcmahan seems to be struggling with interpersonal skills  P: Omar Mcmahan will continue to engage in individual therapy tx will focus on work with Misael  on reducing symptoms of anxiety through the use of CBT and increase use of existing coping skills  Tx plan due next session  Current suicide risk : Low         Behavioral Health Treatment Plan [facility]: Diagnosis and Treatment Plan explained to Bedelia Screen relates understanding diagnosis and is agreeable to Treatment Plan   Yes

## 2022-08-15 VITALS
RESPIRATION RATE: 18 BRPM | HEART RATE: 114 BPM | SYSTOLIC BLOOD PRESSURE: 127 MMHG | TEMPERATURE: 98.7 F | DIASTOLIC BLOOD PRESSURE: 78 MMHG | OXYGEN SATURATION: 96 %

## 2022-08-15 PROCEDURE — 99284 EMERGENCY DEPT VISIT MOD MDM: CPT

## 2022-08-16 ENCOUNTER — HOSPITAL ENCOUNTER (EMERGENCY)
Facility: HOSPITAL | Age: 22
Discharge: HOME | End: 2022-08-16
Attending: EMERGENCY MEDICINE
Payer: COMMERCIAL

## 2022-08-16 ENCOUNTER — APPOINTMENT (EMERGENCY)
Dept: RADIOLOGY | Facility: HOSPITAL | Age: 22
End: 2022-08-16
Payer: COMMERCIAL

## 2022-08-16 DIAGNOSIS — J06.9 VIRAL URI WITH COUGH: Primary | ICD-10-CM

## 2022-08-16 LAB
ALBUMIN SERPL BCP-MCNC: 4.2 G/DL (ref 3.5–5)
ALP SERPL-CCNC: 81 U/L (ref 34–104)
ALT SERPL W P-5'-P-CCNC: 25 U/L (ref 7–52)
ANION GAP SERPL CALCULATED.3IONS-SCNC: 7 MMOL/L (ref 4–13)
AST SERPL W P-5'-P-CCNC: 17 U/L (ref 13–39)
ATRIAL RATE: 98 BPM
BASOPHILS # BLD AUTO: 0.02 THOUSANDS/ΜL (ref 0–0.1)
BASOPHILS NFR BLD AUTO: 0 % (ref 0–1)
BILIRUB SERPL-MCNC: 0.59 MG/DL (ref 0.2–1)
BUN SERPL-MCNC: 10 MG/DL (ref 5–25)
CALCIUM SERPL-MCNC: 9 MG/DL (ref 8.4–10.2)
CARDIAC TROPONIN I PNL SERPL HS: <2 NG/L
CHLORIDE SERPL-SCNC: 102 MMOL/L (ref 96–108)
CO2 SERPL-SCNC: 27 MMOL/L (ref 21–32)
CREAT SERPL-MCNC: 1 MG/DL (ref 0.6–1.3)
D DIMER PPP FEU-MCNC: <0.27 UG/ML FEU
EOSINOPHIL # BLD AUTO: 0.19 THOUSAND/ΜL (ref 0–0.61)
EOSINOPHIL NFR BLD AUTO: 3 % (ref 0–6)
ERYTHROCYTE [DISTWIDTH] IN BLOOD BY AUTOMATED COUNT: 12.5 % (ref 11.6–15.1)
FLUAV RNA RESP QL NAA+PROBE: NEGATIVE
FLUBV RNA RESP QL NAA+PROBE: NEGATIVE
GFR SERPL CREATININE-BSD FRML MDRD: 104 ML/MIN/1.73SQ M
GLUCOSE SERPL-MCNC: 107 MG/DL (ref 65–140)
HCT VFR BLD AUTO: 45.2 % (ref 36.5–49.3)
HGB BLD-MCNC: 15 G/DL (ref 12–17)
IMM GRANULOCYTES # BLD AUTO: 0.02 THOUSAND/UL (ref 0–0.2)
IMM GRANULOCYTES NFR BLD AUTO: 0 % (ref 0–2)
LYMPHOCYTES # BLD AUTO: 1.31 THOUSANDS/ΜL (ref 0.6–4.47)
LYMPHOCYTES NFR BLD AUTO: 21 % (ref 14–44)
MCH RBC QN AUTO: 29.1 PG (ref 26.8–34.3)
MCHC RBC AUTO-ENTMCNC: 33.2 G/DL (ref 31.4–37.4)
MCV RBC AUTO: 88 FL (ref 82–98)
MONOCYTES # BLD AUTO: 0.45 THOUSAND/ΜL (ref 0.17–1.22)
MONOCYTES NFR BLD AUTO: 7 % (ref 4–12)
NEUTROPHILS # BLD AUTO: 4.35 THOUSANDS/ΜL (ref 1.85–7.62)
NEUTS SEG NFR BLD AUTO: 69 % (ref 43–75)
NRBC BLD AUTO-RTO: 0 /100 WBCS
P AXIS: 63 DEGREES
PLATELET # BLD AUTO: 216 THOUSANDS/UL (ref 149–390)
PMV BLD AUTO: 9.4 FL (ref 8.9–12.7)
POTASSIUM SERPL-SCNC: 3.6 MMOL/L (ref 3.5–5.3)
PR INTERVAL: 114 MS
PROT SERPL-MCNC: 6.9 G/DL (ref 6.4–8.4)
QRS AXIS: 28 DEGREES
QRSD INTERVAL: 84 MS
QT INTERVAL: 320 MS
QTC INTERVAL: 398 MS
RBC # BLD AUTO: 5.16 MILLION/UL (ref 3.88–5.62)
RSV RNA RESP QL NAA+PROBE: NEGATIVE
SARS-COV-2 RNA RESP QL NAA+PROBE: NEGATIVE
SODIUM SERPL-SCNC: 136 MMOL/L (ref 135–147)
T WAVE AXIS: 34 DEGREES
VENTRICULAR RATE: 93 BPM
WBC # BLD AUTO: 6.34 THOUSAND/UL (ref 4.31–10.16)

## 2022-08-16 PROCEDURE — 0241U HB NFCT DS VIR RESP RNA 4 TRGT: CPT

## 2022-08-16 PROCEDURE — 93010 ELECTROCARDIOGRAM REPORT: CPT | Performed by: INTERNAL MEDICINE

## 2022-08-16 PROCEDURE — 71045 X-RAY EXAM CHEST 1 VIEW: CPT

## 2022-08-16 PROCEDURE — 93005 ELECTROCARDIOGRAM TRACING: CPT

## 2022-08-16 PROCEDURE — 85025 COMPLETE CBC W/AUTO DIFF WBC: CPT

## 2022-08-16 PROCEDURE — 99285 EMERGENCY DEPT VISIT HI MDM: CPT | Performed by: EMERGENCY MEDICINE

## 2022-08-16 PROCEDURE — 85379 FIBRIN DEGRADATION QUANT: CPT

## 2022-08-16 PROCEDURE — 80053 COMPREHEN METABOLIC PANEL: CPT

## 2022-08-16 PROCEDURE — 84484 ASSAY OF TROPONIN QUANT: CPT

## 2022-08-16 PROCEDURE — 36415 COLL VENOUS BLD VENIPUNCTURE: CPT

## 2022-08-16 RX ORDER — ACETAMINOPHEN 325 MG/1
650 TABLET ORAL ONCE
Status: COMPLETED | OUTPATIENT
Start: 2022-08-16 | End: 2022-08-16

## 2022-08-16 RX ADMIN — ACETAMINOPHEN 650 MG: 325 TABLET ORAL at 01:08

## 2022-08-16 NOTE — ED ATTENDING ATTESTATION
8/15/2022  IJewel MD, saw and evaluated the patient  I have discussed the patient with the resident/non-physician practitioner and agree with the resident's/non-physician practitioner's findings, Plan of Care, and MDM as documented in the resident's/non-physician practitioner's note, except where noted  All available labs and Radiology studies were reviewed  I was present for key portions of any procedure(s) performed by the resident/non-physician practitioner and I was immediately available to provide assistance  At this point I agree with the current assessment done in the Emergency Department  I have conducted an independent evaluation of this patient a history and physical is as follows: Patient is a 23year old male with 1 day of cough, diffuse aches, chest pain  No sob  No sore throat  No fever  Intermittent nausea  No vomiting  No travel  No known ill contacts  Has had covid vaccine with booster as per Epic  Was last seen at [facility] ED on 10/16/21 for abdominal pain  [facility] website checked on this patient and no Rx found  NCAT  Mask in place  Lungs clear  Heart tachycardia without murmur  Nontender chest wall  Abdomen nontender  Good bowel sounds  No edema  No rash noted  DDX including but not limited to: chest wall pain, pleurisy, costochondritis, pericarditis, myocarditis, PTX, pneumonia, GI etiology, covid 23, viral illness;  doubt ACS or MI or dissection or PE or rhabdomyolysis  Will check labs and CXR and EKG       ED Course         Critical Care Time  Procedures

## 2022-08-16 NOTE — DISCHARGE INSTRUCTIONS
Your workup in the emergency department was negative  Her COVID-19 swab was negative as well  Please take Tylenol or ibuprofen as needed every 6 hours for pain or fever  Please take ibuprofen with small meals to avoid upset stomach  Please continue to drink plenty of water or Pedialyte for hydration  Please follow-up with your PCP should her symptoms not begin to resolve in the next 48-72 hours  Please return to the emergency department should you develop chest pain, shortness of breath, fever uncontrolled with medication, lightheadedness or any other symptoms you find concerning

## 2022-08-16 NOTE — ED PROVIDER NOTES
History  Chief Complaint   Patient presents with    Cough     Cough started today with tightness in chest  Denies sob, or any other symptoms  The patient is a 25-year-old male with no relevant past medical history presents emergency department with complaint of cough, chest pain and chills  The patient reports that he felt fine yesterday but earlier today he developed cough and chills that progressed to chest pain  He also reported some mild nausea and decreased appetite denied headache, lightheadedness, change in vision, shortness of breath, abdominal pain, vomiting, dysuria, hematuria, numbness, tingling, rash or fever  The patient states that he would like to be tested for COVID-19  He denies any documented fever at home and has not taken any medications  Prior to Admission Medications   Prescriptions Last Dose Informant Patient Reported? Taking? Melatonin ER 10 MG TBCR  Self No No   Sig: Take 1 tablet (10 mg total) by mouth daily at bedtime   Patient not taking: Reported on 2/7/2022    diphenhydrAMINE (BENADRYL) 50 mg capsule  Self No No   Sig: Take 1 capsule (50 mg total) by mouth every 6 (six) hours as needed for itching or allergies   Patient not taking: Reported on 2/7/2022       Facility-Administered Medications: None       History reviewed  No pertinent past medical history  History reviewed  No pertinent surgical history  Family History   Problem Relation Age of Onset    No Known Problems Mother     No Known Problems Father     No Known Problems Sister     No Known Problems Brother      I have reviewed and agree with the history as documented      E-Cigarette/Vaping    E-Cigarette Use Never User      E-Cigarette/Vaping Substances    Nicotine No     THC No     CBD No     Flavoring No     Other No     Unknown No      Social History     Tobacco Use    Smoking status: Never Smoker    Smokeless tobacco: Never Used   Vaping Use    Vaping Use: Never used   Substance Use Topics    Alcohol use: Never    Drug use: Never        Review of Systems   Constitutional: Positive for chills  Negative for fever  HENT: Negative for congestion, ear pain and sore throat  Eyes: Negative for pain and visual disturbance  Respiratory: Positive for cough  Negative for shortness of breath  Cardiovascular: Positive for chest pain  Negative for palpitations  Gastrointestinal: Positive for nausea  Negative for abdominal distention, abdominal pain and vomiting  Endocrine: Negative  Genitourinary: Negative for dysuria and hematuria  Musculoskeletal: Positive for arthralgias  Negative for back pain, neck pain and neck stiffness  Skin: Negative for color change and rash  Allergic/Immunologic: Negative  Neurological: Negative for seizures and syncope  Hematological: Negative  Psychiatric/Behavioral: Negative  All other systems reviewed and are negative  Physical Exam  ED Triage Vitals   Temperature Pulse Respirations Blood Pressure SpO2   08/15/22 2314 08/15/22 2314 08/15/22 2314 08/15/22 2314 08/15/22 2314   98 7 °F (37 1 °C) (!) 114 18 127/78 96 %      Temp src Heart Rate Source Patient Position - Orthostatic VS BP Location FiO2 (%)   -- -- 08/15/22 2314 08/15/22 2314 --     Sitting Left arm       Pain Score       08/16/22 0108       1             Orthostatic Vital Signs  Vitals:    08/15/22 2314   BP: 127/78   Pulse: (!) 114   Patient Position - Orthostatic VS: Sitting       Physical Exam  Vitals and nursing note reviewed  Constitutional:       General: He is not in acute distress  Appearance: Normal appearance  He is well-developed  He is not ill-appearing  HENT:      Head: Normocephalic and atraumatic  Nose: Nose normal       Mouth/Throat:      Mouth: Mucous membranes are moist       Pharynx: Oropharynx is clear  Eyes:      Extraocular Movements: Extraocular movements intact        Conjunctiva/sclera: Conjunctivae normal       Pupils: Pupils are equal, round, and reactive to light  Cardiovascular:      Rate and Rhythm: Regular rhythm  Tachycardia present  Pulses: Normal pulses  Heart sounds: Normal heart sounds  No murmur heard  No friction rub  Pulmonary:      Effort: Pulmonary effort is normal  No respiratory distress  Breath sounds: Normal breath sounds  No stridor  No wheezing, rhonchi or rales  Abdominal:      General: Abdomen is flat  Bowel sounds are normal  There is no distension  Palpations: Abdomen is soft  Tenderness: There is no abdominal tenderness  There is no guarding or rebound  Musculoskeletal:         General: No swelling or tenderness  Normal range of motion  Cervical back: Normal range of motion and neck supple  No rigidity or tenderness  Right lower leg: No edema  Left lower leg: No edema  Skin:     General: Skin is warm and dry  Capillary Refill: Capillary refill takes less than 2 seconds  Coloration: Skin is not jaundiced  Findings: No erythema or rash  Neurological:      General: No focal deficit present  Mental Status: He is alert and oriented to person, place, and time  Mental status is at baseline  Cranial Nerves: No cranial nerve deficit  Sensory: No sensory deficit  Motor: No weakness  ED Medications  Medications   acetaminophen (TYLENOL) tablet 650 mg (650 mg Oral Given 8/16/22 0108)       Diagnostic Studies  Results Reviewed     Procedure Component Value Units Date/Time    FLU/RSV/COVID - if FLU/RSV clinically relevant [[id number]]  (Normal) Collected: 08/16/22 0057    Lab Status: Final result Specimen: Nares from Nose Updated: 08/16/22 0213     SARS-CoV-2 Negative     INFLUENZA A PCR Negative     INFLUENZA B PCR Negative     RSV PCR Negative    Narrative:      FOR PEDIATRIC PATIENTS - copy/paste COVID Guidelines URL to browser: [email or web address]  [employer]    SARS-CoV-2 assay is a Nucleic Acid Amplification assay intended for the  qualitative detection of nucleic acid from SARS-CoV-2 in nasopharyngeal  swabs  Results are for the presumptive identification of SARS-CoV-2 RNA  Positive results are indicative of infection with SARS-CoV-2, the virus  causing COVID-19, but do not rule out bacterial infection or co-infection  with other viruses  Laboratories within the [place] and its  territories are required to report all positive results to the appropriate  public health authorities  Negative results do not preclude SARS-CoV-2  infection and should not be used as the sole basis for treatment or other  patient management decisions  Negative results must be combined with  clinical observations, patient history, and epidemiological information  This test has not been FDA cleared or approved  This test has been authorized by FDA under an Emergency Use Authorization  (EUA)  This test is only authorized for the duration of time the  declaration that circumstances exist justifying the authorization of the  emergency use of an in vitro diagnostic tests for detection of SARS-CoV-2  virus and/or diagnosis of COVID-19 infection under section 564(b)(1) of  the Act, 21 U  S C  060FBR-1(L)(0), unless the authorization is terminated  or revoked sooner  The test has been validated but independent review by FDA  and CLIA is pending  Test performed using [employer] GeneXpert: This RT-PCR assay targets N2,  a region unique to SARS-CoV-2  A conserved region in the E-gene was chosen  for pan-Sarbecovirus detection which includes SARS-CoV-2      HS Troponin 0hr (reflex protocol) [[id number]]  (Normal) Collected: 08/16/22 0057    Lab Status: Final result Specimen: Blood from Arm, Right Updated: 08/16/22 0203     hs TnI 0hr <2 ng/L     D-Dimer [[id number]]  (Normal) Collected: 08/16/22 0057    Lab Status: Final result Specimen: Blood from Arm, Right Updated: 08/16/22 0142     D-Dimer, Quant <0 27 ug/ml FEU Comprehensive metabolic panel [[id number]] Collected: 08/16/22 0057    Lab Status: Final result Specimen: Blood from Arm, Right Updated: 08/16/22 0141     Sodium 136 mmol/L      Potassium 3 6 mmol/L      Chloride 102 mmol/L      CO2 27 mmol/L      ANION GAP 7 mmol/L      BUN 10 mg/dL      Creatinine 1 00 mg/dL      Glucose 107 mg/dL      Calcium 9 0 mg/dL      AST 17 U/L      ALT 25 U/L      Alkaline Phosphatase 81 U/L      Total Protein 6 9 g/dL      Albumin 4 2 g/dL      Total Bilirubin 0 59 mg/dL      eGFR 104 ml/min/1 73sq m     Narrative:      National Kidney Disease Foundation guidelines for Chronic Kidney Disease (CKD):     Stage 1 with normal or high GFR (GFR > 90 mL/min/1 73 square meters)    Stage 2 Mild CKD (GFR = 60-89 mL/min/1 73 square meters)    Stage 3A Moderate CKD (GFR = 45-59 mL/min/1 73 square meters)    Stage 3B Moderate CKD (GFR = 30-44 mL/min/1 73 square meters)    Stage 4 Severe CKD (GFR = 15-29 mL/min/1 73 square meters)    Stage 5 End Stage CKD (GFR <15 mL/min/1 73 square meters)  Note: GFR calculation is accurate only with a steady state creatinine    CBC and differential [[id number]] Collected: 08/16/22 0057    Lab Status: Final result Specimen: Blood from Arm, Right Updated: 08/16/22 0131     WBC 6 34 Thousand/uL      RBC 5 16 Million/uL      Hemoglobin 15 0 g/dL      Hematocrit 45 2 %      MCV 88 fL      MCH 29 1 pg      MCHC 33 2 g/dL      RDW 12 5 %      MPV 9 4 fL      Platelets 608 Thousands/uL      nRBC 0 /100 WBCs      Neutrophils Relative 69 %      Immat GRANS % 0 %      Lymphocytes Relative 21 %      Monocytes Relative 7 %      Eosinophils Relative 3 %      Basophils Relative 0 %      Neutrophils Absolute 4 35 Thousands/µL      Immature Grans Absolute 0 02 Thousand/uL      Lymphocytes Absolute 1 31 Thousands/µL      Monocytes Absolute 0 45 Thousand/µL      Eosinophils Absolute 0 19 Thousand/µL      Basophils Absolute 0 02 Thousands/µL                  XR chest 1 view portable   ED Interpretation by Marina Mccoy DO (08/16 5916)   No acute cardiopulmonary disease  Procedures  ECG 12 Lead Documentation Only    Date/Time: 8/16/2022 12:55 AM  Performed by: Marina Mccoy DO  Authorized by: Marina Mccoy DO     Indications / Diagnosis:  Chest pain  ECG reviewed by me, the ED Provider: yes    Patient location:  ED  Previous ECG:     Previous ECG:  Unavailable    Comparison to cardiac monitor: No    Interpretation:     Interpretation: abnormal    Quality:     Tracing quality:  Limited by artifact  Rate:     ECG rate:  93    ECG rate assessment: tachycardic    Rhythm:     Rhythm: sinus tachycardia    Ectopy:     Ectopy: none    QRS:     QRS axis:  Normal    QRS intervals:  Normal  Conduction:     Conduction: normal    ST segments:     ST segments:  Normal  T waves:     T waves: normal    Comments:      No acute ischemia  Will check troponins  Not complaining of chest pain at this time  ED Course  ED Course as of 08/16/22 0231   Tue Aug 16, 2022   0108 XR chest 1 view portable  Chest x-ray indicated no acute cardiopulmonary disease  Unremarkable  0109 EKG indicated normal sinus rhythm  We will assess a troponin  Patient is not complaining of chest pain at this time  9028 D-Dimer, Quant: <0 27  Doubt PE    0224 Patient workup in the emergency department was unremarkable  He will be discharged for outpatient follow-up with his PCP  Return precautions discussed  Further instructions per discharge orders               HEART Risk Score    Flowsheet Row Most Recent Value   Heart Score Risk Calculator    History 0 Filed at: 08/16/2022 0231   ECG 0 Filed at: 08/16/2022 0231   Age 0 Filed at: 08/16/2022 0231   Risk Factors 0 Filed at: 08/16/2022 0231   Troponin 0 Filed at: 08/16/2022 0231   HEART Score 0 Filed at: 08/16/2022 0231              PERC Rule for PE    Flowsheet Row Most Recent Value   PERC Rule for PE    Age >=50 0 Filed at: 08/16/2022 0230   HR >=100 1 Filed at: 08/16/2022 0230   O2 Sat on room air < 95% 0 Filed at: 08/16/2022 0230   History of PE or DVT 0 Filed at: 08/16/2022 0230   Recent trauma or surgery 0 Filed at: 08/16/2022 0230   Hemoptysis 0 Filed at: 08/16/2022 0230   Exogenous estrogen 0 Filed at: 08/16/2022 0230   Unilateral leg swelling 0 Filed at: 08/16/2022 0230   PERC Rule for PE Results 1 Filed at: 08/16/2022 0230                  Wells' Criteria for PE    Flowsheet Row Most Recent Value   Wells' Criteria for PE    Clinical signs and symptoms of DVT 0 Filed at: 08/16/2022 0230   PE is primary diagnosis or equally likely 3 Filed at: 08/16/2022 0230   HR >100 1 5 Filed at: 08/16/2022 0230   Immobilization at least 3 days or Surgery in the previous 4 weeks 0 Filed at: 08/16/2022 0230   Previous, objectively diagnosed PE or DVT 0 Filed at: 08/16/2022 0230   Hemoptysis 0 Filed at: 08/16/2022 0230   Malignancy with treatment within 6 months or palliative 0 Filed at: 08/16/2022 0230   Wells' Criteria Total 4 5 Filed at: 08/16/2022 0230            [facility]  Number of Diagnoses or Management Options  Diagnosis management comments: The patient is a 75-year-old male with no relevant past medical history presents emergency department with complaint of cough, chest pain and chills  Upon initial presentation patient was alert oriented x4 and in no acute distress  The patient's physical exam was grossly unremarkable  Due to his complaint of chest pain I have ordered an ACS rule out  He was tachycardic in triage and thus cannot be PERC negative so I have ordered a D-dimer  Labs are pending        Disposition  Final diagnoses:   Viral URI with cough     Time reflects when diagnosis was documented in both MDM as applicable and the Disposition within this note     Time User Action Codes Description Comment    8/16/2022  2:16 AM Yoav Sheets Add [J06 9] Viral URI with cough       ED Disposition     ED Disposition   Discharge    Condition Stable    Date/Time   Tue Aug 16, 2022  2:17 AM    Comment   Lin Cee discharge to home/self care  Follow-up Information     Follow up With Specialties Details Why Contact Info Additional [facility] Schedule an appointment as soon as possible for a visit  For continued viral symptoms  [place] [id number]  [address] , [facility] [place], [address] Emergency Department Emergency Medicine  As needed [address] [facility] [id number] [place] [address] Emergency Department, [address], [place], [place], [address]          Patient's Medications   Discharge Prescriptions    No medications on file     No discharge procedures on file  PDMP Review       Value Time User    PDMP Reviewed  Yes 8/16/2022 12:37 AM Kaykay Valentin MD           ED Provider  Attending physically available and evaluated Lin Cee  I managed the patient along with the ED Attending      Electronically Signed by         Teresita Musa DO  08/16/22 [address] [place] [address] Dr Lund,   08/16/22 0714

## 2022-09-01 ENCOUNTER — SOCIAL WORK (OUTPATIENT)
Dept: BEHAVIORAL/MENTAL HEALTH CLINIC | Facility: CLINIC | Age: 22
End: 2022-09-01
Payer: COMMERCIAL

## 2022-09-01 DIAGNOSIS — F41.9 ANXIETY: Primary | ICD-10-CM

## 2022-09-01 PROCEDURE — 90834 PSYTX W PT 45 MINUTES: CPT | Performed by: COUNSELOR

## 2022-09-01 NOTE — PSYCH
Psychotherapy Provided: Individual Psychotherapy 45 minutes     Length of time in session: 45 minutes, follow up in 1 month    Encounter Diagnosis     ICD-10-CM    1  Anxiety  F41 9        Goals addressed in session: Goal 1, Goal 2 and Goal 3      D: The therapist administers the PHQ-9 assessment  Armen Gan and the therapist review and update his treatment plan  Armen Gan and the therapist review his last session  The therapist ask Armen Gan about the status of his friendships  He reports, "Worse " Armen Gan provides details of the argument with his friend  Armen Gan provides his initial thoughts regarding his friends desire to no longer be his friend  The therapist assist Volodymyr Torres processing thoughts and feelings surrounding recent events  The therapist reviews effective communication skills with Volodymyr Torres goal of expressing feelings and needs appropriately  The therapist focus on the concepts of thoughts, feelings, and behaviors to examine problematic relationship habits  Discuss ways for Armen Gan  to recognize patterns, understand automatic reactions, and offer strategies to encourage change in these habits  A: Armen Gan was engaged throughout the session and seems to be struggling with the status of his friendships  Misael's mood was depressed and affect appropriate  It seems that Armen Gan may feel better about the changes in his friendships if he communicates how he's feeling  P:  Armen Gan will continue to engage in individual therapy tx will focus on work with Misael  on reducing symptoms of anxiety through the use of CBT and increase use of existing coping skills  Tx plan due next session  Current suicide risk : Low         Behavioral Health Treatment Plan  [facility]: Diagnosis and Treatment Plan explained to Gracy Yang relates understanding diagnosis and is agreeable to Treatment Plan   Yes

## 2022-09-01 NOTE — BH TREATMENT PLAN
Ariadna Locke  2000       Date of Initial Treatment Plan: 03/15/2022  Date of Current Treatment Plan: 09/01/22    Treatment Plan Number 2    Strengths/Personal Resources for Self Care: "pretty receptive, understanding and hardworking/ "I don't think I do a good job at that, I go for walks "   Updated 9/1/2022: "I think it's all pretty much the same "    Diagnosis:   1  Anxiety         Area of Needs: "stress, anger maybe lack of confidence, over thinking, maybe not thinking enough, relationships both platonic and romantic and sleep " Updated 9/1/2022: "Sleeping is better and I don't think I've been feeling angry just frustrated " "I feel like relationships have got worse, platonic and romantically the same "       Long Term Goal 1: "Exercise and get back to the gym " Updated 9/1/2022: "Still needs work and have not been able to get to the gym "    Target Date: February 28, 2023  Completion Date: TBD         Short Term Objectives for Goal 1: A: "Set up an exercise routine log " B: "Go to the gym " Updated 9/1/2022: A: Go to the gym 2 days out of 7  B: Pack lunch every day with correct portions for the day C: Incorprate one healthy snack a day    Long Term Goal 2: Learn positive ways to manage stress and anger Updated 9/1/2022: "Maybe still need work I haven't been feeling as angry, but it's still there " Duke Abdi reports a slight decrease in stress "     Target Date: Februray 28, 2023  Completion Date: TBD    Short Term Objectives for Goal 2: A: Reduce anxiety and develop coping skills B: Learn ways to communicate verbally when angry   C: Report feeling positive about self and abilities  Updated 9/1/2022: Duke Abdi reports, "I haven't been feelings as stress or angry so I haven't let it build or linger " A: Continue to reduce anxiety and develop 5 coping skills   B: Learn 2 ways to communicate verbally when angry at friends and family C: Report feeling more positive about self and abilities and discuss during therapy sessions          Long Term Goal # 3: "Be able to lay down and actually fall asleep in a reasonable time "  Updated 9/1/2022: "Better, I have my nights but overall better " Ludy Bills desires to continue to work towards goal completion  Target Date: February 28, 2023  Completion Date: TBD    Short Term Objectives for Goal 3: A: "Limit caffine intake I've been trying to cut back on soda and drink more water " B: "Get to bed by 11:30 " C: "Try to leave a pen and paper near by and anything that stessed me out that day I can write it down before I go to bed " Updated 9/1/2022: Short term goals will remain the same    GOAL 1: Modality: Individual 2x per month   Completion Date TBD and The person(s) responsible for carrying out the plan is  Opal Pace LCSW    GOAL 2: Modality: Individual 2x per month   Completion Date TBD and The person(s) responsible for carrying out the plan is  Ludy Bills and Aaron Pace LCSW    GOAL 3: Modality: Individual 2x per month   Completion Date TBD and The person(s) responsible for carrying out the plan is  Ludy Bills and Aaron Das, [address] [facility] Treatment Plan [facility]: Diagnosis and Treatment Plan explained to Addy Pastrana relates understanding diagnosis and is agreeable to Treatment Plan         Client Comments : Please share your thoughts, feelings, need and/or experiences regarding your treatment plan: none reported Updated 9/1/2022: "I don't think so "

## 2022-10-14 ENCOUNTER — SOCIAL WORK (OUTPATIENT)
Dept: BEHAVIORAL/MENTAL HEALTH CLINIC | Facility: CLINIC | Age: 22
End: 2022-10-14
Payer: COMMERCIAL

## 2022-10-14 DIAGNOSIS — F41.9 ANXIETY: Primary | ICD-10-CM

## 2022-10-14 PROCEDURE — 90834 PSYTX W PT 45 MINUTES: CPT | Performed by: COUNSELOR

## 2022-10-14 NOTE — PSYCH
Psychotherapy Provided: Individual Psychotherapy 55 minutes     Length of time in session: 55 minutes, follow up in 1 month    Encounter Diagnosis     ICD-10-CM    1  Anxiety  F41 9        Goals addressed in session: Goal 1, Goal 2 and Goal 3    Visit Time    Visit Start Time: 1:00 PM  Visit Stop Time: 1:55 PM  Total Visit Duration: 55 minutes    D: Renetta Chinyeredebbie and the therapist review that last session  Renetta Mcwilliams provides an update in regard to his last interaction with his friend and informs the therapist that they are no longer friends  The therapist assist Silvia Lopez processing thoughts and feelings surrounding ending his friendships  Renetta Mcwilliams and the therapist discuss his employment and minor issues he has had with a co worker  The therapist reviews effective communication skills with Silvia Lopez goal of expressing feelings and needs appropriately  Renetta Mcwilliams and the therapist discuss the difference between assertive communication and aggressive communication  Renetta Mcwilliams provides details of his past struggles with using assertive communication with authority figures  The therapist   assist Servando Nance decreasing the frequency of negative self-descriptive statements and increasing frequency of positive self-descriptive statements  A: Renetta Mcwilliams was engaged throughout the session and mood seems improved since the last session  Renetta Mcwilliams continues to struggle with interpersonal skills  P: Renetta Mcwilliams will continue to engage in individual therapy tx will focus on work with Misael  on reducing symptoms of anxiety through the use of CBT and increase use of existing coping skills  Current suicide risk : Low         Behavioral Health Treatment Plan [facility]: Diagnosis and Treatment Plan explained to Matthieu Bhakta relates understanding diagnosis and is agreeable to Treatment Plan   Yes

## 2022-11-19 NOTE — PROGRESS NOTES
Consult received from Dr Polina Colon regarding patient's need for OP [address]; it is also noted that patient is in need of transportation assistance  MSW attempted to call patient to discuss OP MH and transportation, unfortunately, patient did not answer  MSW left VM explained the reason for the call along with the contact information for this writer and for primary OP SW  No other needs at present  MSW to remain available as needed  MED

## 2022-12-09 ENCOUNTER — SOCIAL WORK (OUTPATIENT)
Dept: BEHAVIORAL/MENTAL HEALTH CLINIC | Facility: CLINIC | Age: 22
End: 2022-12-09

## 2022-12-09 DIAGNOSIS — F41.9 ANXIETY: Primary | ICD-10-CM

## 2022-12-09 NOTE — PSYCH
Psychotherapy Provided: Individual Psychotherapy 40 minutes     Follow up in 1 month    Encounter Diagnosis     ICD-10-CM    1  Anxiety  F41 9           Goals addressed in session: Goal 1, Goal 2 and Goal 3      D: The therapist administers the PHQ-9, Misael and the therapist discuss the assessment  Kyaw Bronson reports, "Life, I'm still processing what happened with me and my friends a few months ago " The therapist assist Ora Power expressing thoughts and feelings in relation to their loss  The therapist aids  Iliana Davis identifying coping skills which can be used as Rita Breach on identifying stages of change, processes loss, and moves towards acceptance of loss of friendships  Kyaw Bronson provides an update in regard to entry exam, college math course, inability to contact his advisor, work and obtaining his own health insurance through his employer  The therapist and Kyaw Bronson discuss his plan of action in regard to contacting his advisor or another advisor to plan his classes for the spring semester  The therapist ask Kyaw Bronson about the frequency and intensity of his anxiety symptoms  Kyaw Bronson reports, "It mostly effects my sleep this past hasn't been good " The therapist reviews with  Shereen Norton sleep habits and additional changes which could be made to improve overall sleep hygiene  A: Kyaw Bronson was engaged throughout the session mood and affect appropriate  Kyaw Bronson seems to struggle meeting his goal of exercise, yet has a plan of action moving forward  P: Kyaw Bronson will continue to engage in individual therapy tx will focus on work with Misael  on reducing symptoms of anxiety through the use of CBT and increase use of existing coping skills  Current suicide risk : Low       Behavioral Health Treatment Plan [facility]: Diagnosis and Treatment Plan explained to Charlotte Paul relates understanding diagnosis and is agreeable to Treatment Plan   Yes     Visit start and stop times:    12/09/22  Start Time: 1005  Stop Time: 1045  Total Visit Time: 40 minutes

## 2023-02-20 ENCOUNTER — TELEMEDICINE (OUTPATIENT)
Dept: BEHAVIORAL/MENTAL HEALTH CLINIC | Facility: CLINIC | Age: 23
End: 2023-02-20

## 2023-02-20 DIAGNOSIS — F41.9 ANXIETY: Primary | ICD-10-CM

## 2023-02-21 NOTE — PSYCH
Behavioral Health Psychotherapy Progress Note    Psychotherapy Provided: Individual Psychotherapy     1  Anxiety            Goals addressed in session: Goal 1, Goal 2 and Goal 3      DATA: Samuel Mustafa presents for his session  The therapist and Samuel Mustafa review the last session  The therapist ask Samuel Mustafa how things have been since the last session  Samuel Mustafa provides details of recent issues he has had a work with coworker  Samuel Mustafa continues to provide an update in regard to his interactions with those coworkers and speaking to his supervisor  The therapist and Samuel Mustafa discuss the importance of using effective communication skills in the workplace  The therapist works with Yvonne Dawkins current stressors and identified possible solutions to these problems as well as the pros and cons for each solution  The therapist assist  Claudia Chappellk learning/reviewing various relaxation techniques (e g , deep breathing, meditation, guided imagery) with goal for [facility] use them daily or when stress increases  The therapist ask Samuel Mustafa how things are at home  Samuel Mustafa denies any concerns at home and reports that they have yet to move  Samuel Mustafa provides concerns in regard to his math class and failing two exams  Samuel Mustafa and the therapist discuss his confidence and study habits  During this session, this clinician used the following therapeutic modalities: Cognitive Behavioral Therapy and Supportive Psychotherapy    Substance Abuse was not addressed during this session  If the client is diagnosed with a co-occurring substance use disorder, please indicate any changes in the frequency or amount of use: N/A  Stage of change for addressing substance use diagnoses: No substance use/Not applicable    ASSESSMENT:  Carlito Lance presents with a Euthymic/ normal mood  his affect is Normal range and intensity, which is congruent, with his mood and the content of the session  The client has not made progress on their goals      Samuel Mustafa was engaged throughout the session, but needed guidance from the therapist throughout the session  Jody Conroy presents with a none risk of suicide, none risk of self-harm, and none risk of harm to others  For any risk assessment that surpasses a "low" rating, a safety plan must be developed  A safety plan was indicated: no  If yes, describe in detail N/A    PLAN: Between sessions, Jody Conroy will make efforts to study for his math class  At the next session, the therapist will use Cognitive Behavioral Therapy and Supportive Psychotherapy to address reducing symptoms of anxiety through the use of CBT and increase use of existing coping skills  Behavioral Health Treatment Plan and Discharge Planning: Jody Conroy is aware of and agrees to continue to work on their treatment plan  They have identified and are working toward their discharge goals  yes    Visit start and stop times:    02/21/23  Start Time: 1400  Stop Time: 1440  Total Visit Time: 40 minutes    Virtual Regular Visit    Verification of patient location:    Patient is located in the following state in which I hold an active license [place]      Assessment/Plan:    Problem List Items Addressed This Visit        Other    Anxiety - Primary       Goals addressed in session: Goal 1, Goal 2 and Goal 3           Reason for visit is   Chief Complaint   Patient presents with   • Virtual Regular Visit        Encounter provider Elysia Israel LCSW    Provider located at [address] [place] [id number] [phone]      Recent Visits  Date Type Provider Dept   02/20/23 [address], [address] recent visits within past 7 days and meeting all other requirements  Future Appointments  No visits were found meeting these conditions    Showing future appointments within next 150 days and meeting all other requirements       The patient was identified by name and date of birth  Willi Holden was informed that this is a telemedicine visit and [facility]  He agrees to proceed     My office door was closed  No one else was in the room  He acknowledged consent and understanding of privacy and security of the video platform  The patient has agreed to participate and understands they can discontinue the visit at any time  Patient is aware this is a billable service  Subjective  Willi Holden is a 23 y o  male presents for his session in his home   HPI     No past medical history on file  No past surgical history on file  Current Outpatient Medications   Medication Sig Dispense Refill   • diphenhydrAMINE (BENADRYL) 50 mg capsule Take 1 capsule (50 mg total) by mouth every 6 (six) hours as needed for itching or allergies (Patient not taking: Reported on 2/7/2022 ) 30 capsule 0   • Melatonin ER 10 MG TBCR Take 1 tablet (10 mg total) by mouth daily at bedtime (Patient not taking: Reported on 2/7/2022 ) 30 tablet 1     No current facility-administered medications for this visit  No Known Allergies    Review of Systems    Video Exam    There were no vitals filed for this visit      Physical Exam

## 2023-03-07 ENCOUNTER — OFFICE VISIT (OUTPATIENT)
Dept: FAMILY MEDICINE CLINIC | Facility: CLINIC | Age: 23
End: 2023-03-07

## 2023-03-07 VITALS
RESPIRATION RATE: 18 BRPM | DIASTOLIC BLOOD PRESSURE: 78 MMHG | TEMPERATURE: 98 F | SYSTOLIC BLOOD PRESSURE: 122 MMHG | HEART RATE: 100 BPM | OXYGEN SATURATION: 97 % | BODY MASS INDEX: 36.37 KG/M2 | WEIGHT: 213 LBS | HEIGHT: 64 IN

## 2023-03-07 DIAGNOSIS — T78.40XA ALLERGY, INITIAL ENCOUNTER: ICD-10-CM

## 2023-03-07 DIAGNOSIS — J30.9 ALLERGIC RHINITIS, UNSPECIFIED SEASONALITY, UNSPECIFIED TRIGGER: Primary | ICD-10-CM

## 2023-03-07 RX ORDER — FLUTICASONE PROPIONATE 50 MCG
1 SPRAY, SUSPENSION (ML) NASAL DAILY
Qty: 18.2 ML | Refills: 3 | Status: SHIPPED | OUTPATIENT
Start: 2023-03-07 | End: 2023-04-06

## 2023-03-07 RX ORDER — LORATADINE 10 MG/1
10 TABLET ORAL DAILY
Qty: 30 TABLET | Refills: 0 | Status: SHIPPED | OUTPATIENT
Start: 2023-03-07 | End: 2023-04-06

## 2023-03-07 NOTE — PATIENT INSTRUCTIONS
Buy saline nasal spray over the counter for morning use, 1-2 sprays each nostril   Flonase nasal spray prescribed, use at night time  1-2 sprays each nostril  Use Loratadine (Claritin) 10 mg tablet once a day for 10 days

## 2023-03-07 NOTE — PROGRESS NOTES
Name: Brandie Meek      : 2000      MRN: [id number]  Encounter Provider: Perri Hunter MD  Encounter Date: 3/7/2023   Encounter department: [address] [place] [address]     1  Allergic rhinitis, unspecified seasonality, unspecified trigger  Assessment & Plan:  Discussed flonase nasal spray qhs along with saline nasal spray in the morning  While waiting for flonase to work, take Claritin daily for 10 days then prn  Return if worsening  Orders:  -     fluticasone (FLONASE) 50 mcg/act nasal spray; 1 spray into each nostril daily  -     loratadine (CLARITIN) 10 mg tablet; Take 1 tablet (10 mg total) by mouth daily    2  Allergy, initial encounter  -     fluticasone (FLONASE) 50 mcg/act nasal spray; 1 spray into each nostril daily  -     loratadine (CLARITIN) 10 mg tablet; Take 1 tablet (10 mg total) by mouth daily         Subjective      Lieutenant Newer is a 23 YOM here for congestion for the past 1 5 years which previously worse during changing seasons but this past summer continued to have congestions  It prevents patient from sleeping  Has tried mucinex but no other OTC management  Denies asthma, seasonal allergies, allergies, post nasal drip, cough  Review of Systems   Constitutional: Negative for chills, fever and unexpected weight change  HENT: Positive for congestion  Negative for ear pain, nosebleeds, postnasal drip, rhinorrhea, sinus pressure, sinus pain, sneezing, sore throat and trouble swallowing  Eyes: Negative for photophobia and visual disturbance  Respiratory: Negative for cough, chest tightness, shortness of breath and wheezing  Cardiovascular: Negative for chest pain  Gastrointestinal: Negative for abdominal pain, constipation, diarrhea, nausea and vomiting  Endocrine: Negative for polyuria  Genitourinary: Negative for frequency  Skin: Negative for color change and rash     Neurological: Negative for dizziness, weakness, light-headedness and headaches  Psychiatric/Behavioral: Negative for confusion  Current Outpatient Medications on File Prior to Visit   Medication Sig   • [DISCONTINUED] diphenhydrAMINE (BENADRYL) 50 mg capsule Take 1 capsule (50 mg total) by mouth every 6 (six) hours as needed for itching or allergies (Patient not taking: Reported on 2/7/2022)   • [DISCONTINUED] Melatonin ER 10 MG TBCR Take 1 tablet (10 mg total) by mouth daily at bedtime (Patient not taking: Reported on 2/7/2022)       Objective     /78 (BP Location: Left arm, Patient Position: Sitting, Cuff Size: Large)   Pulse 100   Temp 98 °F (36 7 °C) (Temporal)   Resp 18   Ht 5' 4" (1 626 m)   Wt 96 6 kg (213 lb)   SpO2 97%   BMI 36 56 kg/m²     Physical Exam  Vitals and nursing note reviewed  Constitutional:       General: He is not in acute distress  Appearance: Normal appearance  He is obese  He is not ill-appearing, toxic-appearing or diaphoretic  HENT:      Head: Normocephalic and atraumatic  Right Ear: External ear normal       Left Ear: External ear normal       Nose: Congestion and rhinorrhea present  Mouth/Throat:      Mouth: Mucous membranes are moist    Eyes:      General: No scleral icterus  Right eye: No discharge  Left eye: No discharge  Extraocular Movements: Extraocular movements intact  Conjunctiva/sclera: Conjunctivae normal    Cardiovascular:      Rate and Rhythm: Normal rate and regular rhythm  Pulses: Normal pulses  Heart sounds: Normal heart sounds  Pulmonary:      Effort: Pulmonary effort is normal  No respiratory distress  Abdominal:      Tenderness: There is no abdominal tenderness  Musculoskeletal:         General: No swelling  Cervical back: Normal range of motion  Right lower leg: No edema  Left lower leg: No edema  Skin:     General: Skin is warm  Capillary Refill: Capillary refill takes less than 2 seconds     Neurological: General: No focal deficit present  Mental Status: He is alert and oriented to person, place, and time  Psychiatric:         Mood and Affect: Mood normal          Behavior: Behavior normal          Thought Content:  Thought content normal          Judgment: Judgment normal        Lily Trevino MD

## 2023-03-07 NOTE — ASSESSMENT & PLAN NOTE
Discussed flonase nasal spray qhs along with saline nasal spray in the morning  While waiting for flonase to work, take Claritin daily for 10 days then prn  Return if worsening

## 2023-03-20 ENCOUNTER — TELEMEDICINE (OUTPATIENT)
Dept: BEHAVIORAL/MENTAL HEALTH CLINIC | Facility: CLINIC | Age: 23
End: 2023-03-20

## 2023-03-20 DIAGNOSIS — F41.9 ANXIETY: Primary | ICD-10-CM

## 2023-03-20 NOTE — BH TREATMENT PLAN
[address]  2000     Date of Initial Psychotherapy Assessment: 02/22/2022  Date of Current Treatment Plan: 03/20/23  Treatment Plan Target Date: TBD  Treatment Plan Expiration Date: 09/16/ 2023    Diagnosis:   1  Anxiety            Area(s) of Need: "I feel like relationships have got worse, platonic and romantically the same "     Long Term Goal 1 (in the client's own words): Learn positive ways to manage stress and anger     Stage of Change: Maintenance    Target Date for completion: 09/16/2023     Anticipated therapeutic modalities: Cognitive Behavioral Therapy  Supportive Therapy and Mindfulness Based Strategies      People identified to complete this goal: Misael       Objective 1: (identify the means of measuring success in meeting the objective): Continue to reduce anxiety and develop 5 coping skills       Objective 2: (identify the means of measuring success in meeting the objective): Report feeling more positive about self and abilities and discuss during therapy sessions         I am currently under the care of a [facility] psychiatric provider: no    My [facility] psychiatric provider is: N/A    I am currently taking psychiatric medications: N/A    I feel that I will be ready for discharge from mental health care when I reach the following (measurable goal/objective): "Maybe just the one goal that's still left "     For children and adults who have a legal guardian:   Has there been any change to custody orders and/or guardianship status? NA  If yes, attach updated documentation  I have created my Crisis Plan and have been offered a copy of this plan    [address]: Diagnosis and Treatment Plan explained to [address] [place] [address] acknowledges an understanding of their diagnosis  Hellen Keller agrees to this treatment plan  I have been offered a copy of this Treatment Plan   Yes    Hellen Keller, 2000, actively participated in the review and update of this treatment plan during a virtual session, using the [facility]  provided verbal consent on 3/20/2023 at 2:12 PM  The treatment plan was transcribed into the [employer] [facility] Record at a later time

## 2023-03-20 NOTE — PSYCH
Behavioral Health Psychotherapy Progress Note    Psychotherapy Provided: Individual Psychotherapy     1  Anxiety            Goals addressed in session: Goal 1     DATA: The therapist and Sabra Judge review and update his treatment plan and develop his crisis plan  The therapist and Sabra Judge discuss treatment and follow up appointments  Sabra Judge reports, "It will be better we keep it with monthly due to work and class " The therapist agrees to the plan of action  The therapist ask Tejinderisauro Alfie how his math class is going  Sabra Judge reports that he failed two exams  The therapist suggest Tejinderisauro Alfie getting help from the [facility] in [place]  to aid in passing his upcoming exams  Sabra Judge report, "To be honest I haven't thought about it " The therapist and Sabra Judge discuss the importance of reaching out for support  During this session, this clinician used the following therapeutic modalities: Cognitive Behavioral Therapy and Supportive Psychotherapy    Substance Abuse was not addressed during this session  If the client is diagnosed with a co-occurring substance use disorder, please indicate any changes in the frequency or amount of use: N/A  Stage of change for addressing substance use diagnoses: No substance use/Not applicable    ASSESSMENT:  Zach Dickson presents with a Euthymic/ normal mood  his affect is Normal range and intensity, which is congruent, with his mood and the content of the session  The client has made progress on their goals  Sabra Judge was engaged throughout the session and actively participated in the update and creation of his treatment and crisis plan  Zach Dickson presents with a none risk of suicide, none risk of self-harm, and none risk of harm to others  For any risk assessment that surpasses a "low" rating, a safety plan must be developed      A safety plan was indicated: no  If yes, describe in detail n/a    PLAN: Between sessions, Zach Dickson will "look into getting help for math class " At the next session, the therapist will use Cognitive Behavioral Therapy and Supportive Psychotherapy to address reducing symptoms of anxiety through the use of CBT and increase use of existing coping skills         Behavioral Health Treatment Plan and Discharge Planning: Hussein Willson is aware of and agrees to continue to work on their treatment plan  They have identified and are working toward their discharge goals  yes    Visit start and stop times:    03/20/23  Start Time: 1400  Stop Time: 0811  Total Visit Time: 42 minutes    Virtual Regular Visit    Verification of patient location:    Patient is located in the following state in which I hold an active license [place]      Assessment/Plan:    Problem List Items Addressed This Visit        Other    Anxiety - Primary       Goals addressed in session: Goal 1          Reason for visit is   Chief Complaint   Patient presents with   • Virtual Regular Visit        Encounter provider Abida Cordova LCSW    Provider located at [address] [place] [id number] [phone]      Recent Visits  No visits were found meeting these conditions  Showing recent visits within past 7 days and meeting all other requirements  Today's Visits  Date Type Provider Dept   03/20/23 [address], [address] today's visits and meeting all other requirements  Future Appointments  No visits were found meeting these conditions  Showing future appointments within next 150 days and meeting all other requirements       The patient was identified by name and date of birth  Hussein Willson was informed that this is a telemedicine visit and that [facility]  He agrees to proceed     My office door was closed  No one else was in the room    He acknowledged consent and understanding of privacy and security of the video platform  The patient has agreed to participate and understands they can discontinue the visit at any time  Patient is aware this is a billable service  Subjective  Sabrina Giraldo is a 23 y o  male presents for his session in his home   HPI     No past medical history on file  No past surgical history on file  Current Outpatient Medications   Medication Sig Dispense Refill   • fluticasone (FLONASE) 50 mcg/act nasal spray 1 spray into each nostril daily 18 2 mL 3   • loratadine (CLARITIN) 10 mg tablet Take 1 tablet (10 mg total) by mouth daily 30 tablet 0     No current facility-administered medications for this visit  No Known Allergies    Review of Systems    Video Exam    There were no vitals filed for this visit      Physical Exam

## 2023-03-20 NOTE — BH CRISIS PLAN
Client Name: Elodia Delong       Client YOB: 2000  : 2000    Treatment Team (include name and contact information):     Psychotherapist: Luciana Hatchet Denise Garb, LCSW    Psychiatrist: N/A   Release of information completed: no    " N/A   Release of information completed: no    Other (Specify Role): N/A    Release of information completed: no    Other (Specify Role): N/A   Release of information completed: no    Healthcare Provider  [facility]  [phone]    Type of Plan   * Child plans (children 15 yo and younger) must be completed and signed by the child's legal guardian   * Plans for all individuals 15 yo and above must be signed by the client  Plan Type: adolescent/adult (15 and over) Initial      My Personal Strengths are (in the client's own words):  "Intelligent person, listening to others and nice "     The stressors and triggers that may put me at risk are:  loneliness, feeling a lack of control, being treated unfairly, people (describe - names, etc) people in general and family members on dad side of the family, places (describe) any place that is overcrowded  and emotions (describe) anger and anxiety    Coping skills I can use to keep myself calm and safe: Take a shower, Call a friend or family member and Other (describe) going for walks     Coping skills/supports I can use to maintain abstinence from substance use:   N/A    The people that provide me with help and support: (Include name, contact, and how they can help)   Support person #1: Vanessa Sy     * Phone number: [phone]    * How can they help me? "She listens to me if I ever need to talk and do things together and offer me advice "   Support person #2: Sherman Maria     * Phone number: [phone]    * How can they help me?  "I will complain to him about things at work, he's a parental figure "      Support person #3: N/A    * Phone number: N/A    * How can they help me? N/A    In the past, the following has helped me in times of crisis:    Being in a quiet space, Being with other people, Calling a friend, Calling a family member, Taking a walk or exercising and Watching television or a movie      If it is an emergency and you need immediate help, call 9-1-1    If there is a possibility of danger to yourself or others, call the following crisis hotline resources:     Adult Crisis Numbers  Suicide Prevention Hotline - Dial 9-8-8  [facility]: Antonette Stinson 13: R Myron 56: [address]: [phone]  [facility] ([facility]): [address]: [address] [place] [address] Avenue: [address] [place] [address] St: [phone] (daytime)  [phone] (after hours, weekends, holidays)     Child/Adolescent Crisis Numbers   [facility] WOMEN'S AND CHILDREN'S \[facility] [place] [facility]\"": Jennifer Benz 10: [phone]   Fannie Ruff: [phone]   [address] ([facility]): [phone]    Please note: Some [facility] do not have a separate number for Child/Adolescent specific crisis  If your county is not listed under Child/Adolescent, please call the adult number for your county     National Talk to Text Line   All Ages - [phone]    In the event your feelings become unmanageable, and you cannot reach your support system, you will call 911 immediately or go to the nearest hospital emergency room

## 2023-05-19 ENCOUNTER — TELEMEDICINE (OUTPATIENT)
Dept: BEHAVIORAL/MENTAL HEALTH CLINIC | Facility: CLINIC | Age: 23
End: 2023-05-19

## 2023-05-19 DIAGNOSIS — F41.9 ANXIETY: Primary | ICD-10-CM

## 2023-06-15 ENCOUNTER — TELEPHONE (OUTPATIENT)
Dept: PSYCHIATRY | Facility: CLINIC | Age: 23
End: 2023-06-15

## 2023-06-15 NOTE — TELEPHONE ENCOUNTER
Patient contacted the office stating that his [employer] insurance was no longer active  The patient was inquiring about the self pay rates for therapy appointments  The writer explained the self pay process, however, upon review; informed the patient that the billing team verified the [facility] for the visit  The patient was unaware that he was set up for [facility] through [facility] but was very appreciative of the information  The patient has a VV on 6/19/23 and would appreciative a call back if he for some reason will no longer have verified insurance so that he can prepare for a self pay if needed

## 2023-06-19 ENCOUNTER — TELEMEDICINE (OUTPATIENT)
Dept: BEHAVIORAL/MENTAL HEALTH CLINIC | Facility: CLINIC | Age: 23
End: 2023-06-19
Payer: COMMERCIAL

## 2023-06-19 DIAGNOSIS — F41.9 ANXIETY: Primary | ICD-10-CM

## 2023-06-19 PROCEDURE — 90834 PSYTX W PT 45 MINUTES: CPT | Performed by: COUNSELOR

## 2023-06-20 ENCOUNTER — TELEPHONE (OUTPATIENT)
Dept: BEHAVIORAL/MENTAL HEALTH CLINIC | Facility: CLINIC | Age: 23
End: 2023-06-20

## 2023-06-20 NOTE — PSYCH
"Behavioral Health Psychotherapy Progress Note    Psychotherapy Provided: Individual Psychotherapy     1  Anxiety            Goals addressed in session: Goal 1     DATA: Francia Sears presents for his session and provides an update in regard to his employment  Francia Sears reports his desire not to work and difficulties with his management and expectations the company has for employees  The therapist reviews effective communication skills with Orestes Mullins goal of expressing feelings and needs appropriately  The therapist and Francia Sears discuss how developing relationship with coworkers can be beneficial  Francia Sears provides details of when he and a few of his coworkers went out for his birthday  The therapist ask Franciaparas Sears about his anxiety and sleep hygiene  Francia Sears reports continued difficulties falling asleep  The therapist suggest that Francia Sears contact his PCP to rule out anything medical  Franciaparas Sears reports, \"I'm just an anxious person  \" The therapist works with Dannie Gore identifying sleep habits, discussing healthy sleep hygiene and identifying what changes can be made to improve "[employer]  The therapist assist Dannie Gore learning and implementing calming skills to reduce overall anxiety and manage anxiety symptoms  Francia Sears and the therapist discuss his passion and thoughts about future education and career  During this session, this clinician used the following therapeutic modalities: Cognitive Behavioral Therapy, Mindfulness-based Strategies and Supportive Psychotherapy    Substance Abuse was not addressed during this session  If the client is diagnosed with a co-occurring substance use disorder, please indicate any changes in the frequency or amount of use: N/A  Stage of change for addressing substance use diagnoses: No substance use/Not applicable    ASSESSMENT:  Reed Larson presents with a Euthymic/ normal mood  his affect is Normal range and intensity, which is congruent, with his mood and the content of the session   The client has not made " "progress on their goals  Stella Waterman was engaged throughout the session and yet struggles with the transition to adulthood and the responsibility that come with it  Monica Pittman presents with a none risk of suicide, none risk of self-harm, and none risk of harm to others  For any risk assessment that surpasses a \"low\" rating, a safety plan must be developed  A safety plan was indicated: no  If yes, describe in detail N/A    PLAN: Between sessions, Monica Pittman will continue to work on   At the next session, the therapist will use Cognitive Behavioral Therapy and Supportive Psychotherapy to address  reducing symptoms of anxiety through the use of CBT and increase use of existing coping skills  Behavioral Health Treatment Plan and Discharge Planning: Monica Pittman is aware of and agrees to continue to work on their treatment plan  They have identified and are working toward their discharge goals   yes      Visit start and stop times:    06/21/23  Start Time: 1500  Stop Time: 1545  Total Visit Time: 45 minutes    Virtual Regular Visit    Verification of patient location:    Patient is located at Home in the following state in which I hold an active license [place]      Assessment/Plan:    Problem List Items Addressed This Visit        Other    Anxiety - Primary       Goals addressed in session: Goal 1          Reason for visit is   Chief Complaint   Patient presents with   • Virtual Regular Visit        Encounter provider Wilberto Keane LCSW    Provider located at [address] [place] [id number] [phone]      Recent Visits  Date Type Provider Dept   06/20/23 Telephone Wilberto Keane, [address]   06/19/23 [address] [facility], [address]   06/15/23 Telephone Wilberto Keane, [address] [facility] " recent visits within past 7 days and meeting all other requirements  Future Appointments  No visits were found meeting these conditions  Showing future appointments within next 150 days and meeting all other requirements       The patient was identified by name and date of birth  Libertad Laura was informed that this is a telemedicine visit and that [facility]  He agrees to proceed     My office door was closed  No one else was in the room  He acknowledged consent and understanding of privacy and security of the video platform  The patient has agreed to participate and understands they can discontinue the visit at any time  Patient is aware this is a billable service  Subjective  Libertad Laura is a 20 y o  male    HPI     No past medical history on file  No past surgical history on file  Current Outpatient Medications   Medication Sig Dispense Refill   • fluticasone (FLONASE) 50 mcg/act nasal spray 1 spray into each nostril daily 18 2 mL 3   • loratadine (CLARITIN) 10 mg tablet Take 1 tablet (10 mg total) by mouth daily 30 tablet 0     No current facility-administered medications for this visit  No Known Allergies    Review of Systems    Video Exam    There were no vitals filed for this visit      Physical Exam

## 2023-07-06 ENCOUNTER — SOCIAL WORK (OUTPATIENT)
Dept: BEHAVIORAL/MENTAL HEALTH CLINIC | Facility: CLINIC | Age: 23
End: 2023-07-06
Payer: COMMERCIAL

## 2023-07-06 DIAGNOSIS — F41.9 ANXIETY: Primary | ICD-10-CM

## 2023-07-06 PROCEDURE — 90834 PSYTX W PT 45 MINUTES: CPT | Performed by: COUNSELOR

## 2023-07-06 NOTE — PSYCH
Behavioral Health Psychotherapy Progress Note    Psychotherapy Provided: Individual Psychotherapy     1. Anxiety            Goals addressed in session: Goal 1     DATA: The therapist reviews the last session. The therapist ask Gian Wayne about the intensity and frequency of his anxiety symptoms. Gian Black reports, "The same." The therapist ask about work, Gian Wayne provides details of being called into the office to have a meeting with management. Gian Black provides details of how he advocated for himself. The therapist praises Gian Black on his ability to advocate for himself professionally. Gian Black reports, "I'm a little cautious because they say they understand." The therapist and Gian Black discuss his plans regarding furthering his education and career. The therapist and Gian Black discuss his feelings regarding work, his career and where he feels he is at compared to his friends. 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 Gian Wayne continues to explain his feelings about working and desire not to work and conforming to [employer] about work. Gian Black reports, "Everything feels so difficult." The therapist focuses on Zi Pisano thought process and identified alternative ways of looking at situation and how changing Zi Pisano thought process improves  mood and guides Leny Martínez increasing verbalization of hopeful and positive statements regarding self, others, and the future. During this session, this clinician used the following therapeutic modalities: Cognitive Behavioral Therapy and Solution-Focused Therapy    Substance Abuse was not addressed during this session. If the client is diagnosed with a co-occurring substance use disorder, please indicate any changes in the frequency or amount of use: N/A. Stage of change for addressing substance use diagnoses: No substance use/Not applicable    ASSESSMENT:  Laxmi Martinez presents with a Euthymic/ normal mood.      his affect is Normal range and intensity, which is congruent, with his mood and the content of the session. The client has not made progress on their goals. Simona Jones continues to struggle with the transition to adulthood and responsibilities. Arnulfo Quintanilla presents with a none risk of suicide, none risk of self-harm, and none risk of harm to others. For any risk assessment that surpasses a "low" rating, a safety plan must be developed. A safety plan was indicated: no  If yes, describe in detail N/A    PLAN: Between sessions, Arnulfo Quintanilla will coping skills log. At the next session, the therapist will use Cognitive Behavioral Therapy and Solution-Focused Therapy to address reducing symptoms of anxiety through the use of CBT and increase use of existing coping skills. Behavioral Health Treatment Plan and Discharge Planning: Arnulfo Quintanilla is aware of and agrees to continue to work on their treatment plan. They have identified and are working toward their discharge goals.  yes    Visit start and stop times:    07/06/23  Start Time: 1600  Stop Time: 1645  Total Visit Time: 45 minutes

## 2023-07-20 ENCOUNTER — TELEMEDICINE (OUTPATIENT)
Dept: BEHAVIORAL/MENTAL HEALTH CLINIC | Facility: CLINIC | Age: 23
End: 2023-07-20
Payer: COMMERCIAL

## 2023-07-20 DIAGNOSIS — F41.9 ANXIETY: Primary | ICD-10-CM

## 2023-07-20 PROCEDURE — 90834 PSYTX W PT 45 MINUTES: CPT | Performed by: COUNSELOR

## 2023-07-20 NOTE — PSYCH
Behavioral Health Psychotherapy Progress Note    Psychotherapy Provided: Individual Psychotherapy     1. Anxiety            Goals addressed in session: Goal 1     DATA: Janneth Sarabia reports, "I'm tired." The therapist administered the PHQ-9 and scores a 5 and the VINCENT-7 and scores a 2. The therapist ask Janneth Sarabia about the intensity and frequency of his anxiety symptoms. Janneth No reports, "It's been pretty okay not too bad." The therapist and Janneth Sarabia review his treatment plan and goals. The therapist ask Jannethoscar Sarabia about his anger. Janneth No report, "It's off and on, but hasn't been too terrible." The therapist and Janneth Sarabia review the last session. The therapist ask Jannethoscar Sarabia if he was able to complete his coping skills assignment. Janneth No denied. Janneth Sarabia provides details of how he tried to sit down and calm down because I was frustrated about having to go to the [place] after work. The therapist educates Janneth Sarabia on other possible coping skills. The therapist assess Miriam Gross of [employer] and reviewed coping strategies that have been effective in managing current stressors. Jannethoscar Sarabia  responded positively to feedback and coping strategy suggestions. Janneth Sarabia reports, "I think my problem is when I become annoyed with something it's hard to calm down." Janneth Sarabia and the therapist discuss his inability to manage his frustration going back to childhood. Janneth Sarabia reports, "I try not to let it affect my relationship with other people, it's not fair to take my anger out on other people." The therapist encourages Janneth Sarabia to provide examples of how he has improved in his ability to manage anger, irritability and frustrations. Janneth Sarabia provides examples and also reports that "I'm not a people person and know the importance of being around people." Janneth Sarabia reports, "I've been wrestling with taking myself off social media." Janneth Sarabia reports, "I wonder if I'll have improved mood if I get off social media." The therapist and Janneth Sarabia continue to discuss friendships and healthy friendships.  Janneth Sarabia reports, "I'm actually going out with friends from work tonight."  During this session, this clinician used the following therapeutic modalities: Cognitive Behavioral Therapy and Supportive Psychotherapy    Substance Abuse was not addressed during this session. If the client is diagnosed with a co-occurring substance use disorder, please indicate any changes in the frequency or amount of use: N/A. Stage of change for addressing substance use diagnoses: No substance use/Not applicable    ASSESSMENT:  Arnulfo Quintanilla presents with a Euthymic/ normal mood. his affect is Normal range and intensity, which is congruent, with his mood and the content of the session. The client has not made progress on their goals. Simona Jones was engaged throughout the session despite the camera being faced up at the ceiling fan. Simona Jones continues to struggle with interpersonal skills. Arnulfo Quintanilla presents with a none risk of suicide, none risk of self-harm, and none risk of harm to others. For any risk assessment that surpasses a "low" rating, a safety plan must be developed. A safety plan was indicated: no  If yes, describe in detail N/A    PLAN: Between sessions, Arnulfo Quintanilla will "Spend less time on social media." At the next session, the therapist will use Cognitive Behavioral Therapy and Supportive Psychotherapy to address reducing symptoms of anxiety through the use of CBT and increase use of existing coping skills. Behavioral Health Treatment Plan and Discharge Planning: Arnulfo Quintanilla is aware of and agrees to continue to work on their treatment plan. They have identified and are working toward their discharge goals.  yes    Visit start and stop times:    07/20/23  Start Time: 0900  Stop Time: 0945  Total Visit Time: 45 minutes    Virtual Regular Visit    Verification of patient location:    Patient is located at Home in the following state in which I hold an active license [place]      Assessment/Plan:    Problem List Items Addressed This Visit        Other    Anxiety - Primary       Goals addressed in session: Goal 1          Reason for visit is   Chief Complaint   Patient presents with   • Virtual Regular Visit        Encounter provider Elijah Keating LCSW    Provider located at [address] [place] [address] Road [address] [phone]      Recent Visits  No visits were found meeting these conditions. Showing recent visits within past 7 days and meeting all other requirements  Today's Visits  Date Type Provider Dept   07/20/23 [address] [facility], [address] [place] [address] today's visits and meeting all other requirements  Future Appointments  No visits were found meeting these conditions. Showing future appointments within next 150 days and meeting all other requirements       The patient was identified by name and date of birth. Conchita Fernandez was informed that this is a telemedicine visit and that [facility]. He agrees to proceed. .  My office door was closed. No one else was in the room. He acknowledged consent and understanding of privacy and security of the video platform. The patient has agreed to participate and understands they can discontinue the visit at any time. Patient is aware this is a billable service. Jean-Claude Fernandez is a 20 y.o. male  . HPI     No past medical history on file. No past surgical history on file. Current Outpatient Medications   Medication Sig Dispense Refill   • fluticasone (FLONASE) 50 mcg/act nasal spray 1 spray into each nostril daily 18.2 mL 3   • loratadine (CLARITIN) 10 mg tablet Take 1 tablet (10 mg total) by mouth daily 30 tablet 0     No current facility-administered medications for this visit.         No Known Allergies    Review of Systems    Video Exam    There were no vitals filed for this visit.     Physical Exam

## 2023-08-01 ENCOUNTER — TELEMEDICINE (OUTPATIENT)
Dept: BEHAVIORAL/MENTAL HEALTH CLINIC | Facility: CLINIC | Age: 23
End: 2023-08-01
Payer: COMMERCIAL

## 2023-08-01 DIAGNOSIS — F41.9 ANXIETY: Primary | ICD-10-CM

## 2023-08-01 PROCEDURE — 90834 PSYTX W PT 45 MINUTES: CPT | Performed by: COUNSELOR

## 2023-08-01 NOTE — PSYCH
Behavioral Health Psychotherapy Progress Note    Psychotherapy Provided: Individual Psychotherapy     1. Anxiety            Goals addressed in session: Goal 1     DATA: The therapist reviews the last session with [facility]. [facility] reports, "Not too much has been going on, I don't really." [facility] reports fatigue due to not sleeping last night. [facility] continues to report difficulty falling asleep and reports difficulty with consistency. The therapist again discuss the importance of talking to his PCP about his insomnia. [facility] reports that he tried melatonin but had no results from it. The therapist ask [facility] about any anxiety producing situations that impact his sleep. [facility] denies and reports that his insomnia is due to his work schedule. [facility] continues to report fatigue. The therapist ask [facility] about his mood and [facility] reports, "It's okay I'm cranky now because I'm tired." The therapist encourages [facility] to share more about his fluctuation in his mood. [facility] reports depression symptoms due to "Being stuck inside when I'm off I like to be out of my house." [facility] reports that he decided to go out and go to the mall. [facility] reports, "I felt it wasn't a good financial decision but I felt I needed to do it at that time." The therapist works with Rupali Martinez utilizing behavioral strategies to overcome depression. During this session, this clinician used the following therapeutic modalities: Cognitive Behavioral Therapy, Solution-Focused Therapy and Supportive Psychotherapy    Substance Abuse was not addressed during this session. If the client is diagnosed with a co-occurring substance use disorder, please indicate any changes in the frequency or amount of use: N/A. Stage of change for addressing substance use diagnoses: No substance use/Not applicable    ASSESSMENT:  Ken Munoz presents with a Euthymic/ normal mood. his affect is Normal range and intensity, which is congruent, with his mood and the content of the session.  The client has not made progress on their goals. Tonny Davies was engaged throughout the session and continues to struggle with positivity and positive self-talk. Tonny Davies continues to focus on the same topics. Nadiya Fernandes presents with a none risk of suicide, none risk of self-harm, and none risk of harm to others. For any risk assessment that surpasses a "low" rating, a safety plan must be developed. A safety plan was indicated: no  If yes, describe in detail N/A    PLAN: Between sessions, Nadiya Fernandes will "I guess just trying to learn to not let other people's views get to me." At the next session, the therapist will use Cognitive Behavioral Therapy, Solution-Focused Therapy and Supportive Psychotherapy to address reducing symptoms of anxiety through the use of CBT and increase use of existing coping skills. Behavioral Health Treatment Plan and Discharge Planning: Nadiya Fernandes is aware of and agrees to continue to work on their treatment plan. They have identified and are working toward their discharge goals. yes    Visit start and stop times:    08/01/23  Start Time: 1600  Stop Time: 5181  Total Visit Time: 45 minutes    Virtual Regular Visit    Verification of patient location:    Patient is located at Home in the following state in which I hold an active license [place]      Assessment/Plan:    Problem List Items Addressed This Visit        Other    Anxiety - Primary       Goals addressed in session: Goal 1          Reason for visit is   Chief Complaint   Patient presents with   • Virtual Regular Visit        Encounter provider Cristopher Og LCSW    Provider located at [address] [place] [address] Place [id number] [phone]      Recent Visits  No visits were found meeting these conditions.   Showing recent visits within past 7 days and meeting all other requirements  Today's Visits  Date Type Provider Dept   08/01/23 Telemedicine Gilda Gallagher LCSW [facility] Therapist [facility]   Showing today's visits and meeting all other requirements  Future Appointments  No visits were found meeting these conditions. Showing future appointments within next 150 days and meeting all other requirements       The patient was identified by name and date of birth. Endy Wick was informed that this is a [facility]medicine visit [facility] to proceed. .  My office door was closed. No one else was in the room. He acknowledged consent and understanding of privacy and security of the video platform. The patient has agreed to participate and understands they can discontinue the visit at any time. Patient is aware this is a billable service. Subjective  Endy Wick is a 20 y.o. male  . HPI     No past medical history on file. No past surgical history on file. Current Outpatient Medications   Medication Sig Dispense Refill   • fluticasone (FLONASE) 50 mcg/act nasal spray 1 spray into each nostril daily 18.2 mL 3   • loratadine (CLARITIN) 10 mg tablet Take 1 tablet (10 mg total) by mouth daily 30 tablet 0     No current facility-administered medications for this visit. No Known Allergies    Review of Systems    Video Exam    There were no vitals filed for this visit.     Physical Exam

## 2023-08-17 ENCOUNTER — SOCIAL WORK (OUTPATIENT)
Dept: BEHAVIORAL/MENTAL HEALTH CLINIC | Facility: CLINIC | Age: 23
End: 2023-08-17
Payer: COMMERCIAL

## 2023-08-17 DIAGNOSIS — F41.9 ANXIETY: Primary | ICD-10-CM

## 2023-08-17 PROCEDURE — 90834 PSYTX W PT 45 MINUTES: CPT | Performed by: COUNSELOR

## 2023-08-17 NOTE — PSYCH
Behavioral Health Psychotherapy Progress Note    Psychotherapy Provided: Individual Psychotherapy     1. Anxiety            Goals addressed in session: Goal 1     DATA: The therapist informs Amena Austin that his treatment plan needs to be updated and educates him on the importance of workin clarence treatment goals outside of the therapeutic session. Amena Austin reports, "I'm working on various things to manage." The therapist ask Amena Austin what he has been working on and he repeats "Trying to finish school." Amena Austin report financial stressors. Amena Austin also reports his desire to have a romantic relationship and his past experience with dating apps. Amena Austin and the therapist discuss his home life and recent arguments with his parents about his residing in the home and moving out. Amena Austin and the therapist discuss his life transitions, education and all or nothing thinking. The therapist and Amena Austin review and update his treatment plan. During this session, this clinician used the following therapeutic modalities: Cognitive Behavioral Therapy, Solution-Focused Therapy and Supportive Psychotherapy    Substance Abuse was not addressed during this session. If the client is diagnosed with a co-occurring substance use disorder, please indicate any changes in the frequency or amount of use: N/A. Stage of change for addressing substance use diagnoses: No substance use/Not applicable    ASSESSMENT:  Mikie Kirby presents with a Dysthymic mood. his affect is Flat, which is congruent, with his mood and the content of the session. The client has not made progress on their goals. Amena Austin was engaged throughout the session and continues to struggle actively working on treatment goals. Misael's negative self-talk continues to get in the way of his ability to move forward. to Mikie Kirby presents with a none risk of suicide, none risk of self-harm, and none risk of harm to others.     For any risk assessment that surpasses a "low" rating, a safety plan must be developed. A safety plan was indicated: no  If yes, describe in detail N/A    PLAN: Between sessions, Chiqui Esposito will "School and driving completing [employer] application." At the next session, the therapist will use Cognitive Behavioral Therapy, Solution-Focused Therapy and Supportive Psychotherapy to address symptoms of anxiety through the use of CBT and increase use of existing coping skills. Behavioral Health Treatment Plan and Discharge Planning: Chiqui Esposito is aware of and agrees to continue to work on their treatment plan. They have identified and are working toward their discharge goals.  yes    Visit start and stop times:    08/17/23  Start Time: 1400  Stop Time: 1445  Total Visit Time: 45 minutes

## 2023-08-17 NOTE — BH TREATMENT PLAN
[address]  2000     Date of Initial Psychotherapy Assessment: 02/22/2022  Date of Current Treatment Plan: 08/17/23  Treatment Plan Target Date: TBD  Treatment Plan Expiration Date: 02/13/2024    Diagnosis:   1. Anxiety            Area(s) of Need: "I feel like relationships and romantically."     Long Term Goal 1 (in the client's own words): Learn positive ways to manage stress and anger     Stage of Change: Maintenance    Target Date for completion: 02/13/2024     Anticipated therapeutic modalities: Cognitive Behavioral Therapy.  Supportive Therapy and Mindfulness Based Strategies      People identified to complete this goal: Misael       Objective 1: (identify the means of measuring success in meeting the objective): Continue to reduce anxiety and develop 6 coping skills       Objective 2: (identify the means of measuring success in meeting the objective): Report feeling more positive about self and abilities and discuss during therapy sessions     Long Term Goal 2 (in the client's own words): Have my driving permit     Stage of Change: Action     Target Date for completion: 02/13/2024     Anticipated therapeutic modalities: Cognitive Behavioral Therapy, Supportive Therapy and Solution Focused Therapy      People identified to complete this goal: Misael       Objective 1: (identify the means of measuring success in meeting the objective): Study driving exam application 30 minutes a day       Objective 2: (identify the means of measuring success in meeting the objective): Schedule to take the exam before the end of the year 2023    Long Term Goal 3 (in the client's own words): Applying to [employer] or other college/univertity to further education     Stage of Change: Action     Target Date for completion: 02/13/2024     Anticipated therapeutic modalities: Cognitive Behavioral Therapy, Supportive Therapy and Solution Focused Therapy      People identified to complete this goal: Misael       Objective 1: (identify the means of measuring success in meeting the objective): Contact admissions office       Objective 2: (identify the means of measuring success in meeting the objective): Complete college application      Objective 3: (identify the means of measuring success in meeting the objective): Apply to two or three other colleges/universities       Objective 4: (identify the means of measuring success in meeting the objective): Make arrangements to go on campus tours         I am currently under the care of a [facility] psychiatric provider: no    My [facility] psychiatric provider is: N/A    I am currently taking psychiatric medications: N/A    I feel that I will be ready for discharge from mental health care when I reach the following (measurable goal/objective): "Happy."    For children and adults who have a legal guardian:   Has there been any change to custody orders and/or guardianship status? NA. If yes, attach updated documentation. I have created my Crisis Plan and have been offered a copy of this plan    [address]: Diagnosis and Treatment Plan explained to Alexus acknowledges an understanding of their diagnosis. Laxmi Martinez agrees to this treatment plan. I have been offered a copy of this Treatment Plan. Yes    Laxmi Martinez, 2000, actively participated in the review and update of this treatment plan during a virtual session, using the [address] [place] [address]. Laxmi Martinez  provided verbal consent on 8/17/2023 at 2:12 PM. The treatment plan was transcribed into the [employer] at a later time.

## 2023-09-28 ENCOUNTER — SOCIAL WORK (OUTPATIENT)
Dept: BEHAVIORAL/MENTAL HEALTH CLINIC | Facility: CLINIC | Age: 23
End: 2023-09-28
Payer: COMMERCIAL

## 2023-09-28 DIAGNOSIS — F41.9 ANXIETY: Primary | ICD-10-CM

## 2023-09-28 PROCEDURE — 90834 PSYTX W PT 45 MINUTES: CPT | Performed by: COUNSELOR

## 2023-09-28 NOTE — PSYCH
Behavioral Health Psychotherapy Progress Note    Psychotherapy Provided: Individual Psychotherapy     1. Anxiety            Goals addressed in session: Goal 1     DATA: The therapist administers the PHQ-9 and Misael scores a zero and the VINCENT-7 Misael scores a 3. The therapist and Emily Hills review and discuss the last session as well as his treatment plan. Emily Hills reports that he continues to study for his driving permit. Emily Hills reports that he has an acting audition coming up at [employer] and that his application is due January. Emily Hills continues to provide details of the program and his last audtion with the school. Emily Hills reports that he will start in Fall 2024 if he is accepted. The therapist provides praise and Emily Hills reports, "I want to give it another try." The therapist ask Emily Hills about the intensity and frequency of his anxiety throughout this process. Emily Hills reports, "Pretty low not too worried about it just hoping I get in." The therapist and Emily Hills discuss work, changes in management, interactions with [employer] and applying for other positions in the company as well as at another company. Emily Hills reports, "It hasn't been losing sleep over it, maybe a little more confident and sticking up for myself." The therapist praises Emily Hills for sticking up for himself and encourages Emily Hills to report how he is proud of himself and abilities. The therapist encourages Emily Hills to report his strengths and abilities since the last session. The therapist and Emily Hills discuss what he has been doing outside of work. Emily Hills reports that he has not spent much time with his friends but happy with his friend group. During this session, this clinician used the following therapeutic modalities: Cognitive Behavioral Therapy, Solution-Focused Therapy and Supportive Psychotherapy    Substance Abuse was not addressed during this session. If the client is diagnosed with a co-occurring substance use disorder, please indicate any changes in the frequency or amount of use: N/A.  Stage of change for addressing substance use diagnoses: No substance use/Not applicable    ASSESSMENT:  Demi Gupta presents with a Euthymic/ normal mood. his affect is Normal range and intensity and Flat, which is congruent, with his mood and the content of the session. The client has not made progress on their goals. Fredo Diaz was engaged throughout the session and despite some good things happening Fredo Diaz did not show excitement about his progress and upcoming ventures. Demi Gupta presents with a none risk of suicide, none risk of self-harm, and none risk of harm to others. For any risk assessment that surpasses a "low" rating, a safety plan must be developed. A safety plan was indicated: no  If yes, describe in detail N/A    PLAN: Between sessions, Demi Gupta will "Keep doing what I'm doing and take opportunities and keep studying for the driving test." At the next session, the therapist will use Cognitive Behavioral Therapy, Solution-Focused Therapy and Supportive Psychotherapy to address reducing symptoms of anxiety through the use of CBT and increase use of existing coping skills. Behavioral Health Treatment Plan and Discharge Planning: Demi Gupta is aware of and agrees to continue to work on their treatment plan. They have identified and are working toward their discharge goals.  yes    Visit start and stop times:    09/28/23  Start Time: 1000  Stop Time: 7838  Total Visit Time: 41 minutes

## 2023-10-23 ENCOUNTER — TELEPHONE (OUTPATIENT)
Dept: PSYCHIATRY | Facility: CLINIC | Age: 23
End: 2023-10-23

## 2023-10-23 NOTE — TELEPHONE ENCOUNTER
Patient is calling regarding cancelling an appointment.     Date/Time: 10/23/2023 2pm    Reason: unable to make it    Patient was rescheduled: YES [] NO [x]  If yes, when was Patient reschedule for:     Patient requesting call back to reschedule: YES [x] NO [] Prep Survey      Responses   [facility] facility patient discharged from?  [facility]   Is LACE score < 7 ?  No   Emergency Room discharge w/ pulse ox?  No   Eligibility  Readm Mgmt   Discharge diagnosis  Acute respiratory failure with hypoxia    Does the patient have one of the following disease processes/diagnoses(primary or secondary)?  Other   Does the patient have Home health ordered?  No   Is there a DME ordered?  Yes   What DME was ordered?  Oxygen per [employer]    Prep survey completed?  Yes          Jackeline Evans RN

## 2023-10-25 ENCOUNTER — TELEPHONE (OUTPATIENT)
Dept: BEHAVIORAL/MENTAL HEALTH CLINIC | Facility: CLINIC | Age: 23
End: 2023-10-25

## 2023-10-25 NOTE — TELEPHONE ENCOUNTER
The therapist contacted Ling Begum in regard to scheduling a f/u appointment.  Appointment scheduled for 11/9/23 @ 2:00 PM.

## 2023-11-09 ENCOUNTER — SOCIAL WORK (OUTPATIENT)
Dept: BEHAVIORAL/MENTAL HEALTH CLINIC | Facility: CLINIC | Age: 23
End: 2023-11-09
Payer: COMMERCIAL

## 2023-11-09 DIAGNOSIS — F41.9 ANXIETY: Primary | ICD-10-CM

## 2023-11-09 PROCEDURE — 90834 PSYTX W PT 45 MINUTES: CPT | Performed by: COUNSELOR

## 2023-11-09 NOTE — PSYCH
Behavioral Health Psychotherapy Progress Note    Psychotherapy Provided: Individual Psychotherapy     1. Anxiety            Goals addressed in session: Goal 1, Goal 2, and Goal 3      DATA: Ivania Nevarez presents for his session and reports, "Been okay, not the best week." Ivania Nevarez reports financial stressors and reports that his home internet was disconnected. The therapist ask Ivania Nevarez how the family vacation went. Ivania Nevarez reports, "Could have been better family drama." Ivania Nevarez provides details reporting, "I was annoyed with my step dad." The therapist and Ivania Nevarez discuss the importance of him communicating his feelings to his step dad. Ivania Nevarez continues to provide details of his vacation communicating, "It was fun I got to do things I never did before." Ivania Nevarez reports, "I've been working on my college application." Ivania Nevarez provides details of his plans to apply for [employer] [employer] and new information he received that could waive his application fee. Ivania Nevarez also reports that he asked his mom to take him for his permit test. The therapist praises Ivania Nevarez on the progress that he is making towards his treatment plan goals. The therapist ask Ivania Nevarez to measure his anxiety on a scale 1-10; 1 being low and 10 being high. Ivania Nevarez reports, "A six but that's because of the wifi and school." The therapist and Ivania Nevarez continue to discuss his progress and ability to manage his anxiety symptoms. Ivania Nevarez reports that his sleep has improved and reports that he feels his sleep has improved due to not feeling anxious about work. The therapist and Ivania Nevarez discuss how positive self-talk has been beneficial. Ivania Nevarez and the therapist continue to discuss healthy coping skills to manage his anxiety symptoms. During this session, this clinician used the following therapeutic modalities: Client-centered Therapy, Cognitive Behavioral Therapy, and Supportive Psychotherapy    Substance Abuse was not addressed during this session.  If the client is diagnosed with a co-occurring substance use disorder, please indicate any changes in the frequency or amount of use: N/A. Stage of change for addressing substance use diagnoses: No substance use/Not applicable    ASSESSMENT:  Char Brunner presents with a Euthymic/ normal mood. his affect is Normal range and intensity, which is congruent, with his mood and the content of the session. The client has made progress on their goals. French Motley continues to engage in treatment and actively working towards his goals. Char Brunner presents with a none risk of suicide, none risk of self-harm, and none risk of harm to others. For any risk assessment that surpasses a "low" rating, a safety plan must be developed. A safety plan was indicated: no  If yes, describe in detail N/A    PLAN: Between sessions, Char Brunner will "college my application and permit." At the next session, the therapist will use Client-centered Therapy, Cognitive Behavioral Therapy, and Supportive Psychotherapy to address reducing symptoms of anxiety through the use of CBT and increase use of existing coping skills. .    Behavioral Health Treatment Plan and Discharge Planning: Char Brunner is aware of and agrees to continue to work on their treatment plan. They have identified and are working toward their discharge goals.  yes    Visit start and stop times:    11/09/23  Start Time: 1400  Stop Time: 1445  Total Visit Time: 45 minutes

## 2023-11-20 ENCOUNTER — OFFICE VISIT (OUTPATIENT)
Dept: FAMILY MEDICINE CLINIC | Facility: CLINIC | Age: 23
End: 2023-11-20

## 2023-11-20 VITALS
TEMPERATURE: 98.3 F | BODY MASS INDEX: 35.5 KG/M2 | OXYGEN SATURATION: 98 % | WEIGHT: 206.8 LBS | SYSTOLIC BLOOD PRESSURE: 120 MMHG | DIASTOLIC BLOOD PRESSURE: 79 MMHG | HEART RATE: 85 BPM | RESPIRATION RATE: 16 BRPM

## 2023-11-20 DIAGNOSIS — T78.40XA ALLERGY, INITIAL ENCOUNTER: ICD-10-CM

## 2023-11-20 DIAGNOSIS — R09.81 NASAL CONGESTION: Primary | ICD-10-CM

## 2023-11-20 PROCEDURE — 99213 OFFICE O/P EST LOW 20 MIN: CPT | Performed by: FAMILY MEDICINE

## 2023-11-20 RX ORDER — CETIRIZINE HYDROCHLORIDE 10 MG/1
10 TABLET ORAL DAILY
Qty: 30 TABLET | Refills: 2 | Status: SHIPPED | OUTPATIENT
Start: 2023-11-20 | End: 2024-02-18

## 2023-11-20 RX ORDER — AZELASTINE 1 MG/ML
1 SPRAY, METERED NASAL 2 TIMES DAILY
Qty: 30 ML | Refills: 0 | Status: SHIPPED | OUTPATIENT
Start: 2023-11-20 | End: 2023-12-20

## 2023-11-20 NOTE — PATIENT INSTRUCTIONS
Use Flonase once a day, 1 puff in each nostril once a day  Zyrtec one tablet once a day   Saline rinses once daily  Astelin spray 1 puff each nostril twice a day

## 2023-11-20 NOTE — PROGRESS NOTES
Name: Felix Donnelly      : 2000      MRN: [id number]  Encounter Provider: Gina Bains MD  Encounter Date: 2023   Encounter department: [address]     1. Nasal congestion  Assessment & Plan:  2 year hx of nasal congestion, reports L>R. Endorses post nasal drip symptoms, otherwise denies associated symptoms. Has not tried anything at home. On physical exam, nasal turbinates erythematous and enlarged bilaterally. No sinus tenderness to palpation. Will start Flonase to be used daily for congestion and post nasal drip. Rx'd zytrec to be taken daily to control allergies. Will rx azelastine bid daily for allergic rhinitis component of symptoms. Discussed daily nasal saline usage as well. No indication for antibiotics at this time given hx and physical exam findings. Patient to follow up in one month or sooner as needed. 2. Allergy, initial encounter  -     cetirizine (ZyrTEC) 10 mg tablet; Take 1 tablet (10 mg total) by mouth daily  -     azelastine (ASTELIN) 0.1 % nasal spray; 1 spray into each nostril 2 (two) times a day Use in each nostril as directed           Subjective     Mr. Norberto Kiran presents to clinic due to concern for nasal congestion. Patient states nasal congestion has been occurring for the past two years. He states the congestion in left nares is worse than right nares but he does experience congestion of both sides. Patient states symptoms are worse in the fall and winter and congestion does not get worse at certain times of day. Patient denies rhinorrhea, sinuses pressure or fullness. He denies fevers, chills, throat discomfort, prior injury to nose. Patient states he has not been taking any medications for symptoms at home. Review of Systems   Constitutional:  Negative for chills and fever. HENT:  Positive for congestion and postnasal drip.  Negative for ear pain, rhinorrhea, sinus pressure, sinus pain and sore throat. Eyes:  Negative for discharge, redness and visual disturbance. Respiratory:  Negative for cough and shortness of breath. Cardiovascular:  Negative for chest pain and palpitations. Gastrointestinal:  Negative for abdominal pain, constipation and nausea. Genitourinary:  Negative for difficulty urinating and dysuria. Musculoskeletal:  Negative for arthralgias and myalgias. Skin:  Negative for rash. Neurological:  Negative for dizziness and headaches. History reviewed. No pertinent past medical history. History reviewed. No pertinent surgical history. Family History   Problem Relation Age of Onset    No Known Problems Mother     No Known Problems Father     No Known Problems Sister     No Known Problems Brother      Social History     Socioeconomic History    Marital status: Single     Spouse name: None    Number of children: 0    Years of education: None    Highest education level: None   Occupational History    None   Tobacco Use    Smoking status: Never    Smokeless tobacco: Never   Vaping Use    Vaping Use: Never used   Substance and Sexual Activity    Alcohol use: Never    Drug use: Never    Sexual activity: Yes     Partners: Female   Other Topics Concern    None   Social History Narrative    None     Social Determinants of Health     Financial Resource Strain: Low Risk  (3/7/2023)    Overall Financial Resource Strain (CARDIA)     Difficulty of Paying Living Expenses: Not hard at all   Food Insecurity: No Food Insecurity (3/7/2023)    Hunger Vital Sign     Worried About Running Out of Food in the Last Year: Never true     Ran Out of Food in the Last Year: Never true   Transportation Needs: No Transportation Needs (3/7/2023)    PRAPARE - Transportation     Lack of Transportation (Medical): No     Lack of Transportation (Non-Medical):  No   Physical Activity: Inactive (2/7/2022)    Exercise Vital Sign     Days of Exercise per Week: 0 days     Minutes of Exercise per Session: 0 min   Stress: Stress Concern Present (2/8/2022)    [facility]     Feeling of Stress : To some extent   Social Connections: Socially Isolated (2/7/2022)    Social Connection and Isolation Panel [NHANES]     Frequency of Communication with Friends and Family: More than three times a week     Frequency of Social Gatherings with Friends and Family: More than three times a week     Attends Bahai Services: Never     Active Member of Clubs or Organizations: No     Attends Club or Organization Meetings: Never     Marital Status: Never    Intimate Partner Violence: Not At Risk (3/7/2023)    Humiliation, Afraid, Rape, and Kick questionnaire     Fear of Current or Ex-Partner: No     Emotionally Abused: No     Physically Abused: No     Sexually Abused: No   Housing Stability: [address]  (3/7/2023)    Housing Stability Vital Sign     Unable to Pay for Housing in the Last Year: No     Number of [address] in the Last Year: 1     Unstable Housing in the Last Year: No     Current Outpatient Medications on File Prior to Visit   Medication Sig    fluticasone (FLONASE) 50 mcg/act nasal spray 1 spray into each nostril daily    [DISCONTINUED] loratadine (CLARITIN) 10 mg tablet Take 1 tablet (10 mg total) by mouth daily     No Known Allergies  Immunization History   Administered Date(s) Administered    COVID-19 MODERNA VACC 0.5 ML IM 12/20/2021    COVID-19 PFIZER VACCINE 0.3 ML IM 04/14/2021, 05/05/2021    Influenza, injectable, quadrivalent, preservative free 0.5 mL 11/24/2020       Objective     /79   Pulse 85   Temp 98.3 °F (36.8 °C)   Resp 16   Wt 93.8 kg (206 lb 12.8 oz)   SpO2 98%   BMI 35.50 kg/m²     Physical Exam  Constitutional:       General: He is not in acute distress. Appearance: Normal appearance. HENT:      Head: Normocephalic and atraumatic.       Right Ear: Tympanic membrane, ear canal and external ear normal.      Left Ear: Tympanic membrane, ear canal and external ear normal.      Nose: No nasal deformity. Right Nostril: No foreign body, epistaxis or occlusion. Left Nostril: No foreign body, epistaxis or occlusion. Right Turbinates: Enlarged and swollen. Left Turbinates: Enlarged and swollen. Right Sinus: No maxillary sinus tenderness or frontal sinus tenderness. Left Sinus: No maxillary sinus tenderness or frontal sinus tenderness. Cardiovascular:      Rate and Rhythm: Normal rate and regular rhythm. Pulses: Normal pulses. Heart sounds: No murmur heard. Pulmonary:      Effort: Pulmonary effort is normal. No respiratory distress. Breath sounds: Normal breath sounds. Abdominal:      General: Bowel sounds are normal.      Palpations: Abdomen is soft. Tenderness: There is no abdominal tenderness. Musculoskeletal:         General: Normal range of motion. Cervical back: Normal range of motion. Right lower leg: No edema. Left lower leg: No edema. Skin:     General: Skin is warm. Neurological:      General: No focal deficit present. Mental Status: He is alert and oriented to person, place, and time.        Haylie Chan MD

## 2023-11-21 ENCOUNTER — OFFICE VISIT (OUTPATIENT)
Dept: FAMILY MEDICINE CLINIC | Facility: CLINIC | Age: 23
End: 2023-11-21

## 2023-11-21 VITALS
WEIGHT: 208.2 LBS | TEMPERATURE: 98.9 F | OXYGEN SATURATION: 98 % | RESPIRATION RATE: 18 BRPM | DIASTOLIC BLOOD PRESSURE: 76 MMHG | HEART RATE: 77 BPM | BODY MASS INDEX: 35.55 KG/M2 | HEIGHT: 64 IN | SYSTOLIC BLOOD PRESSURE: 117 MMHG

## 2023-11-21 DIAGNOSIS — Z23 ENCOUNTER FOR IMMUNIZATION: ICD-10-CM

## 2023-11-21 DIAGNOSIS — Z11.4 SCREENING FOR HIV (HUMAN IMMUNODEFICIENCY VIRUS): ICD-10-CM

## 2023-11-21 DIAGNOSIS — R53.83 FATIGUE, UNSPECIFIED TYPE: ICD-10-CM

## 2023-11-21 DIAGNOSIS — F41.9 ANXIETY: ICD-10-CM

## 2023-11-21 DIAGNOSIS — Z00.00 ANNUAL PHYSICAL EXAM: Primary | ICD-10-CM

## 2023-11-21 DIAGNOSIS — Z11.59 NEED FOR HEPATITIS C SCREENING TEST: ICD-10-CM

## 2023-11-21 PROCEDURE — 90471 IMMUNIZATION ADMIN: CPT

## 2023-11-21 PROCEDURE — 99395 PREV VISIT EST AGE 18-39: CPT | Performed by: FAMILY MEDICINE

## 2023-11-21 PROCEDURE — 90686 IIV4 VACC NO PRSV 0.5 ML IM: CPT

## 2023-11-21 NOTE — ASSESSMENT & PLAN NOTE
2 year hx of nasal congestion, reports L>R. Endorses post nasal drip symptoms, otherwise denies associated symptoms. Has not tried anything at home. On physical exam, nasal turbinates erythematous and enlarged bilaterally. No sinus tenderness to palpation. Will start Flonase to be used daily for congestion and post nasal drip. Rx'd zytrec to be taken daily to control allergies. Will rx azelastine bid daily for allergic rhinitis component of symptoms. Discussed daily nasal saline usage as well. No indication for antibiotics at this time given hx and physical exam findings. Patient to follow up in one month or sooner as needed.

## 2023-11-21 NOTE — ASSESSMENT & PLAN NOTE
Patient has a history of anxiety and follows with a therapist who has been helping him work through his anxiety for some time. He declines VINCENT-7 and states he is not amenable to medication at this time even if VINCENT 7 were to be positive he states.      Follow up as needed for anxiety concerns

## 2023-11-21 NOTE — ASSESSMENT & PLAN NOTE
Non specific fatigue symptoms affecting daily energy. 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      Cbc with diff  Cmp

## 2023-11-21 NOTE — ASSESSMENT & PLAN NOTE
BMI Counseling: Body mass index is 35.74 kg/m². The BMI is above normal. Nutrition recommendations include reducing portion sizes, 3-5 servings of fruits/vegetables daily, consuming healthier snacks, moderation in carbohydrate intake, and reducing intake of saturated fat and trans fat. Exercise recommendations include moderate aerobic physical activity for 150 minutes/week and strength training exercises.     - lipid pannel ordered

## 2023-11-21 NOTE — PROGRESS NOTES
[address] [facility] [place]    NAME: Arthur Perez  AGE: 20 y.o. SEX: male  : 2000     DATE: 2023     Assessment and Plan:     Problem List Items Addressed This Visit       Anxiety     Patient has a history of anxiety and follows with a therapist who has been helping him work through his anxiety for some time. He declines VINCENT-7 and states he is not amenable to medication at this time even if VINCENT 7 were to be positive he states. Follow up as needed for anxiety concerns         Annual physical exam - Primary     Pt states he is doing well at baseline state of health and without any acute concerns or questions at this visit. - Screening for cardiovascular disease: yearly CMP and Lipid panel ordered today. - HIV and Hep C screenings: ordered today  - Vaccinations: Flu vaccine given today. - Depression screening: PHQ score refused   - Counseled the patient on healthy lifestyle habits including weight loss, diet, and exercise. - Drivers license form signed today in office. Relevant Orders    CBC and differential    Comprehensive metabolic panel    Lipid panel    BMI 33.0-33.9,adult     BMI Counseling: Body mass index is 35.74 kg/m². The BMI is above normal. Nutrition recommendations include reducing portion sizes, 3-5 servings of fruits/vegetables daily, consuming healthier snacks, moderation in carbohydrate intake, and reducing intake of saturated fat and trans fat. Exercise recommendations include moderate aerobic physical activity for 150 minutes/week and strength training exercises. - lipid pannel ordered           Fatigue     Non specific fatigue symptoms affecting daily energy. 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      Cbc with diff  Cmp          Other Visit Diagnoses       Need for hepatitis C screening test        Relevant Orders    Hepatitis C Antibody Screening for HIV (human immunodeficiency virus)        Relevant Orders    HIV 1/2 AG/AB w Reflex [facility] for 2 yr old and above    Encounter for immunization        Relevant Orders    influenza vaccine, quadrivalent, 0.5 mL, preservative-free, for adult and pediatric patients 6 mos+ (AFLURIA, FLUARIX, FLULAVAL, FLUZONE) (Completed)            Immunizations and preventive care screenings were discussed with patient today. Appropriate education was printed on patient's after visit summary. Counseling:  Dental Health: discussed importance of regular tooth brushing, flossing, and dental visits. Exercise: the importance of regular exercise/physical activity was discussed. Recommend exercise 3-5 times per week for at least 30 minutes. Smoking- none   Alcohol- minimal   No vaping   No marijuana   No drug use        Depression Screening and Follow-up Plan: Patient was screened for depression during today's encounter. They screened negative with a PHQ-2 score of 0. Return if symptoms worsen or fail to improve, for anxiety. Chief Complaint:   15-year-old male past medical history anxiety currently following with a therapist presenting today for annual physical exam a 's license form as he is getting his permit for the first time to drive. He has no other medical complaints. Chief Complaint   Patient presents with    Physical Exam     Was forms to be filled out for his Drivers permit      History of Present Illness:     Adult Annual Physical   Patient here for a comprehensive physical exam. The patient reports no problems. Diet and Physical Activity  Diet/Nutrition: well balanced diet, limited junk food, and adequate fiber intake. Exercise: walking, moderate cardiovascular exercise, and 5-7 times a week on average.       Depression Screening  PHQ-2/9 Depression Screening    Little interest or pleasure in doing things: 0 - not at all  Feeling down, depressed, or hopeless: 0 - not at all  PHQ-2 Score: 0  PHQ-2 Interpretation: Negative depression screen       General Health  Sleep: gets 7-8 hours of sleep on average and snores loudly. Hearing: normal - bilateral.  Vision: no vision problems. Dental: no dental visits for >1 year.  Health  History of STDs?: no.  Not currently sexually active     Advanced Care Planning  Do you have an advanced directive? no  Do you have a durable medical power of ? no     Review of Systems:     Review of Systems   Constitutional:  Negative for activity change, fatigue, fever and unexpected weight change. HENT:  Negative for congestion, rhinorrhea and sore throat. Eyes:  Negative for visual disturbance. Respiratory:  Negative for cough, chest tightness, shortness of breath and wheezing. Cardiovascular:  Negative for chest pain, palpitations and leg swelling. Gastrointestinal:  Negative for abdominal pain, constipation, diarrhea, nausea and vomiting. Endocrine: Negative for polydipsia, polyphagia and polyuria. Genitourinary:  Negative for decreased urine volume, difficulty urinating and urgency. Musculoskeletal:  Negative for arthralgias, back pain, gait problem and joint swelling. Skin:  Negative for rash. Neurological:  Negative for dizziness, seizures, weakness, light-headedness, numbness and headaches. Psychiatric/Behavioral:  Negative for behavioral problems and confusion. Past Medical History:     History reviewed. No pertinent past medical history. Past Surgical History:     History reviewed. No pertinent surgical history.    Social History:     Social History     Socioeconomic History    Marital status: Single     Spouse name: None    Number of children: 0    Years of education: None    Highest education level: None   Occupational History    None   Tobacco Use    Smoking status: Never    Smokeless tobacco: Never   Vaping Use    Vaping Use: Never used   Substance and Sexual Activity    Alcohol use: Never    Drug use: Never    Sexual activity: Yes     Partners: Female   Other Topics Concern    None   Social History Narrative    None     Social Determinants of Health     Financial Resource Strain: Low Risk  (3/7/2023)    Overall Financial Resource Strain (CARDIA)     Difficulty of Paying Living Expenses: Not hard at all   Food Insecurity: No Food Insecurity (3/7/2023)    Hunger Vital Sign     Worried About Running Out of Food in the Last Year: Never true     Ran Out of Food in the Last Year: Never true   Transportation Needs: No Transportation Needs (3/7/2023)    PRAPARE - Transportation     Lack of Transportation (Medical): No     Lack of Transportation (Non-Medical): No   Physical Activity: Inactive (2/7/2022)    Exercise Vital Sign     Days of Exercise per Week: 0 days     Minutes of Exercise per Session: 0 min   Stress: Stress Concern Present (2/8/2022)    [facility]     Feeling of Stress :  To some extent   Social Connections: Socially Isolated (2/7/2022)    Social Connection and Isolation Panel [NHANES]     Frequency of Communication with Friends and Family: More than three times a week     Frequency of Social Gatherings with Friends and Family: More than three times a week     Attends Spiritism Services: Never     Active Member of Clubs or Organizations: No     Attends Club or Organization Meetings: Never     Marital Status: Never    Intimate Partner Violence: Not At Risk (3/7/2023)    Humiliation, Afraid, Rape, and Kick questionnaire     Fear of Current or Ex-Partner: No     Emotionally Abused: No     Physically Abused: No     Sexually Abused: No   Housing Stability: Low Risk  (3/7/2023)    Housing Stability Vital Sign     Unable to Pay for Housing in the Last Year: No     Number of Places Lived in the Last Year: 1     Unstable Housing in the Last Year: No      Family History:     Family History   Problem Relation Age of Onset    Diabetes Mother Cancer Father     No Known Problems Sister     No Known Problems Brother       Current Medications:     Current Outpatient Medications   Medication Sig Dispense Refill    azelastine (ASTELIN) 0.1 % nasal spray 1 spray into each nostril 2 (two) times a day Use in each nostril as directed 30 mL 0    cetirizine (ZyrTEC) 10 mg tablet Take 1 tablet (10 mg total) by mouth daily 30 tablet 2    fluticasone (FLONASE) 50 mcg/act nasal spray 1 spray into each nostril daily 18.2 mL 3     No current facility-administered medications for this visit. Allergies:     No Known Allergies   Physical Exam:     /76 (BP Location: Left arm, Patient Position: Sitting, Cuff Size: Large)   Pulse 77   Temp 98.9 °F (37.2 °C) (Temporal)   Resp 18   Ht 5' 4" (1.626 m)   Wt 94.4 kg (208 lb 3.2 oz)   SpO2 98%   BMI 35.74 kg/m²     Physical Exam  Constitutional:       General: He is not in acute distress. Appearance: Normal appearance. He is normal weight. He is not ill-appearing or toxic-appearing. HENT:      Head: Normocephalic and atraumatic. Right Ear: Tympanic membrane, ear canal and external ear normal. There is no impacted cerumen. Left Ear: Tympanic membrane, ear canal and external ear normal. There is no impacted cerumen. Nose: Nose normal.      Mouth/Throat:      Mouth: Mucous membranes are moist.      Pharynx: Oropharynx is clear. No oropharyngeal exudate. Eyes:      General: No scleral icterus. Extraocular Movements: Extraocular movements intact. Conjunctiva/sclera: Conjunctivae normal.      Pupils: Pupils are equal, round, and reactive to light. Cardiovascular:      Rate and Rhythm: Normal rate and regular rhythm. Pulses: Normal pulses. Heart sounds: Normal heart sounds. No murmur heard. No friction rub. No gallop. Pulmonary:      Effort: Pulmonary effort is normal. No respiratory distress. Breath sounds: Normal breath sounds. No wheezing.    Abdominal: General: Abdomen is flat. There is no distension. Palpations: Abdomen is soft. Tenderness: There is no abdominal tenderness. Musculoskeletal:         General: No swelling. Normal range of motion. Cervical back: Normal range of motion. Skin:     General: Skin is warm and dry. Capillary Refill: Capillary refill takes less than 2 seconds. Neurological:      General: No focal deficit present. Mental Status: He is alert and oriented to person, place, and time. Mental status is at baseline. Psychiatric:         Mood and Affect: Mood normal.         Behavior: Behavior normal.         Thought Content:  Thought content normal.         Judgment: Judgment normal.          DO Shawnee Zamora

## 2023-11-21 NOTE — ASSESSMENT & PLAN NOTE
Pt states he is doing well at baseline state of health and without any acute concerns or questions at this visit. - Screening for cardiovascular disease: yearly CMP and Lipid panel ordered today. - HIV and Hep C screenings: ordered today  - Vaccinations: Flu vaccine given today. - Depression screening: PHQ score refused   - Counseled the patient on healthy lifestyle habits including weight loss, diet, and exercise. - Drivers license form signed today in office.

## 2023-12-21 ENCOUNTER — SOCIAL WORK (OUTPATIENT)
Dept: BEHAVIORAL/MENTAL HEALTH CLINIC | Facility: CLINIC | Age: 23
End: 2023-12-21
Payer: COMMERCIAL

## 2023-12-21 DIAGNOSIS — F41.9 ANXIETY: Primary | ICD-10-CM

## 2023-12-21 PROCEDURE — 90834 PSYTX W PT 45 MINUTES: CPT | Performed by: COUNSELOR

## 2023-12-21 NOTE — PSYCH
"Behavioral Health Psychotherapy Progress Note    Psychotherapy Provided: Individual Psychotherapy     1. Anxiety            Goals addressed in session: Goal 1, Goal 2, and Goal 3      DATA: Misael reports, \"[employer] audition is next month and getting everything needed for that.\" Misael reports, \"Anxious, it's a little scary.\" The therapist and Misael discuss how him feeling anxious about this audition is expected. 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 The therapist ask Misael about his application process for [employer]. Misael reports, \"I want to focus on this school first.\" The therapist question Misael about the intensity and frequency of his anxiety symptoms. Misael reports, \"It's fine, nothing really going on.\" The therapist ask Misael about his getting his permit.\" Misael reports, \"I got it, the first test I failed and got really mad my mom calmed me down.\" The therapist and Misael discuss his anger. Misael reports, \"Off and on outside if the permit.\" The therapist identifies situations, thoughts and feelings that trigger anger, angry verbal and/or behavioral actions for Misael and the targets of those actions.  The therapist and Misael review his treatment plan and discuss how he has completed his goals and plans for update and review of his treatment plan and possible discharge.   During this session, this clinician used the following therapeutic modalities: Cognitive Behavioral Therapy, Solution-Focused Therapy, and Supportive Psychotherapy    Substance Abuse was not addressed during this session. If the client is diagnosed with a co-occurring substance use disorder, please indicate any changes in the frequency or amount of use: N/A. Stage of change for addressing substance use diagnoses: No substance use/Not applicable    ASSESSMENT:  Misael Kitchen presents with a Euthymic/ normal mood.     his affect is Normal range and intensity, which is congruent, with his mood and the " "content of the session. The client has made progress on their goals.    Misael continues to actively engage in treatment and Misael continues to meet treatment plan goals. Misael seems hesitant about discharge and some of his concerns are issues that may not need therapeutic intervention.  Misael Kitchen presents with a none risk of suicide, none risk of self-harm, and none risk of harm to others.    For any risk assessment that surpasses a \"low\" rating, a safety plan must be developed.    A safety plan was indicated: no  If yes, describe in detail N/A    PLAN: Between sessions, Misael Kitchen will \"Continue to work on [facility] for audition.\" At the next session, the therapist will use Cognitive Behavioral Therapy, Mindfulness-based Strategies, and Supportive Psychotherapy to address reducing symptoms of anxiety through the use of CBT and increase use of existing coping skills.    Behavioral Health Treatment Plan and Discharge Planning: Misael Kitchen is aware of and agrees to continue to work on their treatment plan. They have identified and are working toward their discharge goals. yes    Visit start and stop times:    12/21/23  Start Time: 1100  Stop Time: 1145  Total Visit Time: 45 minutes  "

## 2024-01-18 ENCOUNTER — SOCIAL WORK (OUTPATIENT)
Dept: BEHAVIORAL/MENTAL HEALTH CLINIC | Facility: CLINIC | Age: 24
End: 2024-01-18
Payer: COMMERCIAL

## 2024-01-18 DIAGNOSIS — F41.9 ANXIETY: Primary | ICD-10-CM

## 2024-01-18 PROCEDURE — 90834 PSYTX W PT 45 MINUTES: CPT | Performed by: COUNSELOR

## 2024-01-18 NOTE — BH TREATMENT PLAN
"Outpatient Behavioral Health Psychotherapy Treatment Plan    Misael M Imtiaz  2000     Date of Initial Psychotherapy Assessment: 02/22/2022  Date of Current Treatment Plan: 01/18/24  Treatment Plan Target Date: TBD  Treatment Plan Expiration Date: 07/16/2024    Diagnosis:   1. Anxiety              Area(s) of Need: \"Romantically relationship and irritability general frustration.\"    Long Term Goal 1 (in the client's own words): Learn positive ways to manage stress and anger     Stage of Change: Preparation    Target Date for completion: TBD     Anticipated therapeutic modalities: Cognitive Behavioral Therapy. Supportive Therapy and Mindfulness Based Strategies      People identified to complete this goal: Misael     Objective 1: (identify the means of measuring success in meeting the objective): Attend all scheduled therapy sessions       Objective 2: (identify the means of measuring success in meeting the objective): Continue to reduce anxiety and develop 6 coping skills       Objective 3: (identify the means of measuring success in meeting the objective): Report feeling more positive about self and abilities and discuss during therapy sessions     Objective 4: (identify the means of measuring success in meeting the objective): Learn 2 positive anger management skills      Objective 5: (identify the means of measuring success in meeting the objective): Learn 3 ways to communicate verbally when angry     Objective 6: (identify the means of measuring success in meeting the objective): Identify the advantages and disadvantages of holding onto anger and of forgiveness; discuss with therapist    Objective 7: (identify the means of measuring success in meeting the objective): Explore and resolve conflict with parents and within             I am currently under the care of a [facility] psychiatric provider: no    My [facility] psychiatric provider is: N/A    I am currently taking psychiatric medications:  N/A    I feel " "that I will be ready for discharge from mental health care when I reach the following (measurable goal/objective): \"I still think I have a bit to go.\"    For children and adults who have a legal guardian:   Has there been any change to custody orders and/or guardianship status? NA. If yes, attach updated documentation.    I have created my Crisis Plan and have been offered a copy of this plan    Behavioral Health Treatment Plan [facility]: Diagnosis and Treatment Plan explained to Misael Kitchen acknowledges an understanding of their diagnosis. Misael Kitchen agrees to this treatment plan.                                                              "

## 2024-01-18 NOTE — PSYCH
"Behavioral Health Psychotherapy Progress Note    Psychotherapy Provided: Individual Psychotherapy     1. Anxiety            Goals addressed in session: Goal 1     DATA: The therapist administers the PHQ-9 and Misael scored a 1. The therapist and Misael review and update his treatment plan. Misael provides details of his holiday reporting, \"I've been doing okay.\" The therapist ask Misael if there has been an increase in stress. Misael reports, \"Okay, some day I was like ugh, but it resolved itself.\" Misael provides details of recent work stressors. The therapist assist Misael  in learning/reviewing various relaxation techniques (e.g., deep breathing, meditation, guided imagery) with goal for Misael  to use them daily or when stress increases. Misael and the therapist discuss his concerns in regard to his anger. Misael reports, \"Sometimes I'm upset with myself and I didn't say anything.\" Misael reports, \"I've always been the person to latch onto things and it's the worst.\" The therapist assist Misael  with processing thoughts and feelings surrounding his struggles with holding onto situations that have made him angry .    During this session, this clinician used the following therapeutic modalities: Cognitive Behavioral Therapy and Supportive Psychotherapy    Substance Abuse was not addressed during this session. If the client is diagnosed with a co-occurring substance use disorder, please indicate any changes in the frequency or amount of use: N/A. Stage of change for addressing substance use diagnoses: No substance use/Not applicable    ASSESSMENT:  Misael Kitchen presents with a Euthymic/ normal mood.     his affect is Normal range and intensity, which is congruent, with his mood and the content of the session. The client has not made progress on their goals.    Misael was engaged throughout the session and continues to focus on things in other's that he cannot change.  Misael Kitchen presents with a none risk of suicide, none risk of " "self-harm, and none risk of harm to others.    For any risk assessment that surpasses a \"low\" rating, a safety plan must be developed.    A safety plan was indicated: no  If yes, describe in detail N/A    PLAN: Between sessions, Misael Weirwald will review worksheets provided in regard to anger. At the next session, the therapist will use Cognitive Behavioral Therapy and Solution-Focused Therapy to address reducing symptoms of anxiety through the use of CBT and increase use of existing coping skills and reducing the intensity and frequency of anger dyscontrol through increase use of existing anger management skills.     Behavioral Health Treatment Plan and Discharge Planning: Misaeldebbie Kitchen is aware of and agrees to continue to work on their treatment plan. They have identified and are working toward their discharge goals. yes    Visit start and stop times:    01/18/24  Start Time: 1100  Stop Time: 1145  Total Visit Time: 45 minutes  "

## 2024-02-20 ENCOUNTER — TELEMEDICINE (OUTPATIENT)
Dept: BEHAVIORAL/MENTAL HEALTH CLINIC | Facility: CLINIC | Age: 24
End: 2024-02-20
Payer: COMMERCIAL

## 2024-02-20 DIAGNOSIS — F41.9 ANXIETY: Primary | ICD-10-CM

## 2024-02-20 PROCEDURE — 90832 PSYTX W PT 30 MINUTES: CPT | Performed by: COUNSELOR

## 2024-02-20 NOTE — PSYCH
"Behavioral Health Psychotherapy Progress Note    Psychotherapy Provided: Individual Psychotherapy     1. Anxiety            Goals addressed in session: Goal 1     DATA: Misael reports, \"I had my audition last Saturday and I was accepted.\" Misael reports, \"I'm very very excited.\" Misael provides details of his plan of action moving forward with the college. Misael reports, \"I got the hardest part done.\" Misael reports his concerns about finances and attending college. Misael reports, \"I'm a little scared, but excited.\" Misael provides more details of his audition. The therapist again congratulates Misael on his audition. The therapist ask Misael about his ability to manage his anger since the last session. Misael reports, \"It's been good.\" Misael reports, \"This whole thing has been keeping me in a good mood.\"  Session was shorter due to Misael not having an concerns or issues.  During this session, this clinician used the following therapeutic modalities: Cognitive Behavioral Therapy and Supportive Psychotherapy    Substance Abuse was not addressed during this session. If the client is diagnosed with a co-occurring substance use disorder, please indicate any changes in the frequency or amount of use: N/A. Stage of change for addressing substance use diagnoses: No substance use/Not applicable    ASSESSMENT:  Misael Kitchen presents with a Euthymic/ normal mood.     his affect is Normal range and intensity and Flat, which is congruent, with his mood and the content of the session. The client has made progress on their goals.    Misael despite getting into his dream school, affect still flat.  Misael Kitchen presents with a none risk of suicide, none risk of self-harm, and none risk of harm to others.    For any risk assessment that surpasses a \"low\" rating, a safety plan must be developed.    A safety plan was indicated: no  If yes, describe in detail N/A    PLAN: Between sessions, Misael Kitchen will \"I just want to keep at communicating and " "figuring out the school thing.\" At the next session, the therapist will use Cognitive Behavioral Therapy, Mindfulness-based Strategies, and Supportive Psychotherapy to address reducing symptoms of anxiety through the use of CBT and increase use of existing coping skills and reducing the intensity and frequency of anger dyscontrol through increase use of existing anger management skills.      Behavioral Health Treatment Plan and Discharge Planning: Misael Kitchen is aware of and agrees to continue to work on their treatment plan. They have identified and are working toward their discharge goals. yes    Visit start and stop times:    02/20/24  Start Time: 1400  Stop Time: 1427  Total Visit Time: 27 minutes  Virtual Regular Visit    Verification of patient location:    Patient is located at Home in the following state in which I hold an active license [place]      Assessment/Plan:    Problem List Items Addressed This Visit          Other    Anxiety - Primary       Goals addressed in session: Goal 1          Reason for visit is   Chief Complaint   Patient presents with    Virtual Regular Visit          Encounter provider Ansley Das LCSW    Provider located at [facility] [place]  [facility] [place]  [address]  [place] [address] [phone]      Recent Visits  No visits were found meeting these conditions.  Showing recent visits within past 7 days and meeting all other requirements  Today's Visits  Date Type Provider Dept   02/20/24 Telemedicine Ansley Das LCSW  [facility] [place]   Showing today's visits and meeting all other requirements  Future Appointments  No visits were found meeting these conditions.  Showing future appointments within next 150 days and meeting all other requirements       The patient was identified by name and date of birth. Misael Kitchen was informed that this is a telemedicine visit and that the " visit is being conducted throughthe Epic Embedded platform. He agrees to proceed..  My office door was closed. No one else was in the room.  He acknowledged consent and understanding of privacy and security of the video platform. The patient has agreed to participate and understands they can discontinue the visit at any time.    Patient is aware this is a billable service.     Subjective  Misael Kitchen is a 23 y.o. male  .      HPI     No past medical history on file.    No past surgical history on file.    Current Outpatient Medications   Medication Sig Dispense Refill    azelastine (ASTELIN) 0.1 % nasal spray 1 spray into each nostril 2 (two) times a day Use in each nostril as directed 30 mL 0    cetirizine (ZyrTEC) 10 mg tablet Take 1 tablet (10 mg total) by mouth daily 30 tablet 2    fluticasone (FLONASE) 50 mcg/act nasal spray 1 spray into each nostril daily 18.2 mL 3     No current facility-administered medications for this visit.        No Known Allergies    Review of Systems    Video Exam    There were no vitals filed for this visit.    Physical Exam

## 2024-02-21 PROBLEM — A08.4 VIRAL GASTROENTERITIS: Status: RESOLVED | Noted: 2020-09-28 | Resolved: 2024-02-21

## 2024-02-22 ENCOUNTER — TELEPHONE (OUTPATIENT)
Dept: PSYCHIATRY | Facility: CLINIC | Age: 24
End: 2024-02-22

## 2024-02-22 NOTE — TELEPHONE ENCOUNTER
Patient LVM stating they had health insurance questions.    Writer called and spoke with patient. Patient stated they were an established patient and had questions about his coverage. Writer gave refugio telephone number for further assistance.

## 2024-02-22 NOTE — TELEPHONE ENCOUNTER
Left voicemail informing patient of the Psych Encounter form needing to be signed as a requirement from the insurance company for billing purposes. Patient can access form via [employer] and sign electronically.     Please make patient aware this form must be signed for each visit as a requirement to continue future visits with provider.

## 2024-03-14 ENCOUNTER — SOCIAL WORK (OUTPATIENT)
Dept: BEHAVIORAL/MENTAL HEALTH CLINIC | Facility: CLINIC | Age: 24
End: 2024-03-14

## 2024-03-14 DIAGNOSIS — F41.9 ANXIETY: Primary | ICD-10-CM

## 2024-03-14 NOTE — PSYCH
"Behavioral Health Psychotherapy Progress Note    Psychotherapy Provided: Individual Psychotherapy     1. Anxiety            Goals addressed in session: Goal 1     DATA: The therapist and Misael review and update his safety plan. The therapist and Misael review the last session. Misael reports, \"I'm a little stressed and trying to remain positive.\" Misael continues to provide details and reports, \"I applied for financial aid.\" Misael continues to report stressors in regard to finances for school and needing to obtain his own health insurance. The therapist praises Misael on his continued efforts to maintain positivity. Misael reports, \"At this point I've done all I can do so far.\" The therapist aids Misael in providing him with resources for the [employer] and [employer] for health insurance. The therapist works with Misael  on current stressors and identified possible solutions to these problems as well as the pros and cons for each solution. The therapist and Misael assist Misael  on learning and implementing calming skills to reduce overall anxiety and manage anxiety symptoms.    During this session, this clinician used the following therapeutic modalities: Cognitive Behavioral Therapy, Mindfulness-based Strategies, and Supportive Psychotherapy    Substance Abuse was not addressed during this session. If the client is diagnosed with a co-occurring substance use disorder, please indicate any changes in the frequency or amount of use: N/A. Stage of change for addressing substance use diagnoses: No substance use/Not applicable    ASSESSMENT:  Misael Kitchen presents with a Euthymic/ normal mood.     his affect is Normal range and intensity, which is congruent, with his mood and the content of the session. The client has made progress on their goals.    Misael continues to be optimistic about school.  Misael Kitchen presents with a none risk of suicide, none risk of self-harm, and none risk of harm to others.    For any risk assessment that " "surpasses a \"low\" rating, a safety plan must be developed.    A safety plan was indicated: no  If yes, describe in detail N/A    PLAN: Between sessions, Misael Kitchen will \"Keep at it and try top remain as level headed as I can.\" At the next session, the therapist will use Cognitive Behavioral Therapy, Mindfulness-based Strategies, and Supportive Psychotherapy to address reducing symptoms of anxiety through the use of CBT and increase use of existing coping skills and reducing the intensity and frequency of anger dyscontrol through increase use of existing anger management skills.       Behavioral Health Treatment Plan and Discharge Planning: Misael Kitchen is aware of and agrees to continue to work on their treatment plan. They have identified and are working toward their discharge goals. yes    Visit start and stop times:    03/14/24  Start Time: 1400  Stop Time: 1453  Total Visit Time: 53 minutes  "

## 2024-03-14 NOTE — BH CRISIS PLAN
Client Name: Misael Kitchen       Client YOB: 2000    Jose DavidGregory Safety Plan      Creation Date: 3/14/24 Update Date: 3/13/25   Created By: Ansley Das LCSW       Step 1: Warning Signs:   Warning Signs   irritable   lack of sleep            Step 2: Internal Coping Strategies:   Internal Coping Strategies   go outside   take a shower            Step 3: People and social settings that provide distraction:   Name Contact Information   Yanira Brignoni contact in celll phone   Fazal Bookersto contact in cell phone    [facility]   park           Step 4: People whom I can ask for help during a crisis:      Name Contact Information    Yanira Brignoni contact in celll phone    Yacinia Clau contact in cell phone      Step 5: Professionals or agencies I can contact during a crisis:      Clinican/Agency Name Phone Emergency Contact    Ansley Das LCSW [phone] ext. [phone]       Local Emergency Department Emergency Department Phone Emergency Department Address    [facility] [phone] [address], [place] [address]        Crisis Phone Numbers:   Suicide Prevention Lifeline: Call or Text  031 Crisis Text Line: Text HOME to 752-391   Please note: Some [facility] do not have a separate number for Child/Adolescent specific crisis. If your county is not listed under Child/Adolescent, please call the adult number for your county      Adult Crisis Numbers: Child/Adolescent Crisis Numbers   [facility]: [phone] [facility]: [phone]   [facility]: [phone] [facility]: [phone]   [facility]: [phone] [place]: [phone]   [facility]: [phone] [place]: [phone]   [place] [facility]: [phone]   [facility]: [phone]   [facility]: [phone]   [facility]: [phone] (daytime) [phone] (after hours, weekends, holidays)      Step 6: Making the environment safer  "(plan for lethal means safety):   Patient did not identify any lethal methods: Yes     Optional: What is most important to me and worth living for?   \"Living and I like being alive.\"     [facility] Safety Plan. Ileana Main and Jalil Jenkins. Used with permission of the authors.           "

## 2024-04-25 ENCOUNTER — SOCIAL WORK (OUTPATIENT)
Dept: BEHAVIORAL/MENTAL HEALTH CLINIC | Facility: CLINIC | Age: 24
End: 2024-04-25
Payer: COMMERCIAL

## 2024-04-25 DIAGNOSIS — F41.9 ANXIETY: Primary | ICD-10-CM

## 2024-04-25 PROCEDURE — 90837 PSYTX W PT 60 MINUTES: CPT | Performed by: COUNSELOR

## 2024-04-25 NOTE — PSYCH
"Behavioral Health Psychotherapy Progress Note    Psychotherapy Provided: Individual Psychotherapy     1. Anxiety            Goals addressed in session: Goal 1     DATA: Misael presents for his session and provides an update in regard to college. Misael reports, \"School has just been my focus, still stressing out.\" Misael continues to provide details of the process with school and the loan he applied for and was approved. The therapist validates Misael  distress and difficulties as understandable given Misael  particular circumstances, thoughts, and feelings. Misael reports his feelings in regard to his finances reports, \"I don't know what working looks like with school.\" The therapist validates Misael  distress and difficulties as understandable given Misael  particular circumstances, thoughts, and feelings. Misael reports, \"I'm trying not to stress about things.\" The therapist praises Misael for continuing to get goals accomplished in regard to his future. Misael reports, \"I'm super proud of myself.\" The therapist works with Misael  on increasing Misael  use of coping and mindfulness skills to deal with current stressors.  During this session, this clinician used the following therapeutic modalities: Cognitive Behavioral Therapy, Solution-Focused Therapy, and Supportive Psychotherapy      Substance Abuse was not addressed during this session. If the client is diagnosed with a co-occurring substance use disorder, please indicate any changes in the frequency or amount of use: N/A. Stage of change for addressing substance use diagnoses: No substance use/Not applicable    ASSESSMENT:  Misael Kitchen presents with a Euthymic/ normal mood.     his affect is Normal range and intensity, which is congruent, with his mood and the content of the session. The client has made progress on their goals.    Misael was engaged throughout the session and continues to meet treatment goals.  Misael Kitchen presents with a none risk of suicide, none risk of " "self-harm, and none risk of harm to others.    For any risk assessment that surpasses a \"low\" rating, a safety plan must be developed.    A safety plan was indicated: no  If yes, describe in detail N/A    PLAN: Between sessions, Misael Kitchen will \"Focus on the school and health insurance.\" At the next session, the therapist will use Cognitive Behavioral Therapy, Solution-Focused Therapy, and Supportive Psychotherapy to address  reducing symptoms of anxiety through the use of CBT and increase use of existing coping skills and reducing the intensity and frequency of anger dyscontrol through increase use of existing anger management skills.    Behavioral Health Treatment Plan and Discharge Planning: Misael Kitchen is aware of and agrees to continue to work on their treatment plan. They have identified and are working toward their discharge goals. yes    Visit start and stop times:    04/25/24  Start Time: 1100  Stop Time: 1153  Total Visit Time: 53 minutes  "

## 2024-05-23 ENCOUNTER — SOCIAL WORK (OUTPATIENT)
Dept: BEHAVIORAL/MENTAL HEALTH CLINIC | Facility: CLINIC | Age: 24
End: 2024-05-23

## 2024-05-23 DIAGNOSIS — F41.9 ANXIETY: Primary | ICD-10-CM

## 2024-05-23 NOTE — PSYCH
"Behavioral Health Psychotherapy Progress Note    Psychotherapy Provided: Individual Psychotherapy     1. Anxiety            Goals addressed in session: Goal 1     DATA: Misael reports that this will be the last session due to not renewing his [facility] insurance. The therapist communicates understanding. The therapist again educates Misael on virtual platforms available when he goes to school in [place] if he desires to continue mental health treatment. Misael provides details of his orientation. Misael reports, \"Im excited about living in [place].\" Misael provided details of finding new health insurance. The therapist praises Misael on his skills and abilities to complete tasks and achieve goals even when faced with challenges. The therapist and Misael continues to discuss his progress. Misael continues to provide details of upcoming events leading to his move to [place]. Misael provides details of recent event at work and him walking off the job. The therapist addresses Misael  emotional dysregulation, possible causes, reaction/response to it and how Misael  can manage/respond to these emotions in a positive and helpful manner. Misael and the therapist discuss discharge and the therapist informs Misael that he will receive a discharge letter.   During this session, this clinician used the following therapeutic modalities: Client-centered Therapy, Cognitive Behavioral Therapy, and Supportive Psychotherapy    Substance Abuse was not addressed during this session. If the client is diagnosed with a co-occurring substance use disorder, please indicate any changes in the frequency or amount of use: N/A. Stage of change for addressing substance use diagnoses: No substance use/Not applicable    ASSESSMENT:  Misael Kitchen presents with a Euthymic/ normal mood.     his affect is Normal range and intensity, which is congruent, with his mood and the content of the session. The client has made progress on their goals.    Misael has " "successfully completed treatment and will be discharged from service.  Misael Kitchen presents with a none risk of suicide, none risk of self-harm, and none risk of harm to others.    For any risk assessment that surpasses a \"low\" rating, a safety plan must be developed.    A safety plan was indicated: no  If yes, describe in detail N/A    PLAN: Between sessions, Misael Kitchen will be discharged from services due to no longer having insurance and moving to [place].     Behavioral Health Treatment Plan and Discharge Planning: Misael Kitchen is aware of and agrees to continue to work on their treatment plan. They have identified and are working toward their discharge goals. yes    Visit start and stop times:    05/23/24  Start Time: 1100  Stop Time: 1145  Total Visit Time: 45 minutes  "

## 2024-05-24 ENCOUNTER — TELEPHONE (OUTPATIENT)
Dept: PSYCHIATRY | Facility: CLINIC | Age: 24
End: 2024-05-24

## 2024-05-24 ENCOUNTER — DOCUMENTATION (OUTPATIENT)
Dept: BEHAVIORAL/MENTAL HEALTH CLINIC | Facility: CLINIC | Age: 24
End: 2024-05-24

## 2024-05-24 DIAGNOSIS — F41.9 ANXIETY: Primary | ICD-10-CM

## 2024-05-24 NOTE — PROGRESS NOTES
"Psychotherapy Discharge Summary    Preferred Name: Misael Kitchen  YOB: 2000    Admission date to psychotherapy: 02/22/2022    Referred by: self     Presenting Problem:  Misael reports, \"Stress, anxiety, anger, annoyance and lack of sleep.\" Misael reports a lack of sleep onset over one year. He reports that he can be tired and still unable to fall asleep. He reports that when he lays down for bed \"My heart just races.\" Misael reports that he has always been an anxious person. Misael reports that he noticed an increase in anxiety symptoms during high school with an increase in symptoms throughout the years. Misael reports the following triggers: \"work., most social situations, I get along with most people, theater.\" Misael reports, \"I don't now if what I'm feeling valid and I overreacting and do I have a right to feel this way.\" Misael reports that he questions himself with his friendships and if he is overacting.         Course of treatment included : individual therapy     Progress/Outcome of Treatment Goals (brief summary of course of treatment) Misael actively participated in treatment and has achieved treatment goals.     Treatment Complications (if any): N/A    Treatment Progress: excellent    Current [facility] Psychiatric Provider: N/A    Discharge Medications include: N/A    Discharge Date: 05/24/2024    Discharge Diagnosis:   1. Anxiety            Criteria for Discharge: completed treatment goals and objectives and is no longer in need of services    Aftercare recommendations include (include specific referral names and phone numbers, if appropriate): N/A    Prognosis: excellent    "

## 2024-05-30 NOTE — TELEPHONE ENCOUNTER
DISCHARGE LETTER for Genaro Das LCSW (certified and regular) placed in outgoing mail on 05/30/24.    Article #:  [id number]    Address:  [facility] [address]   [place] [address]

## 2024-06-03 ENCOUNTER — TELEPHONE (OUTPATIENT)
Dept: FAMILY MEDICINE CLINIC | Facility: CLINIC | Age: 24
End: 2024-06-03

## 2024-06-03 NOTE — TELEPHONE ENCOUNTER
Folder (to be completed by): Dr. Beck    Name of Form: [facility] Physician Health Certificate  ([employer] [place])    Color Folder: Red    Form to be faxed to:  PT will collect when ready

## 2024-06-03 NOTE — TELEPHONE ENCOUNTER
Patient needs form filled out to return to school (needs by June 10th).    Physical is updated (not due until November)

## 2024-06-04 ENCOUNTER — TELEPHONE (OUTPATIENT)
Dept: FAMILY MEDICINE CLINIC | Facility: CLINIC | Age: 24
End: 2024-06-04

## 2024-06-04 NOTE — TELEPHONE ENCOUNTER
Patient called and requested to be notified when form is complete so he can pick it up at the office

## 2024-06-05 NOTE — TELEPHONE ENCOUNTER
Form completed, original taken to front office for , copy at front office for scanning. Please let patient know

## 2024-06-18 ENCOUNTER — TELEPHONE (OUTPATIENT)
Dept: FAMILY MEDICINE CLINIC | Facility: CLINIC | Age: 24
End: 2024-06-18

## 2025-06-24 ENCOUNTER — TELEPHONE (OUTPATIENT)
Age: 25
End: 2025-06-24

## 2025-06-24 NOTE — TELEPHONE ENCOUNTER
Received call from pt. Pt would like to be reestablished with talk therapy. Pt recently moved back to the area. Pt stated that he has ambetter insurance. Writer informed pt that [facility] services is not accepting ambetter insurance at this time. Writer sent psych resource guide to pt email.     [email or web address]

## 2025-07-07 ENCOUNTER — OFFICE VISIT (OUTPATIENT)
Dept: FAMILY MEDICINE CLINIC | Facility: CLINIC | Age: 25
End: 2025-07-07

## 2025-07-07 VITALS
SYSTOLIC BLOOD PRESSURE: 126 MMHG | DIASTOLIC BLOOD PRESSURE: 80 MMHG | BODY MASS INDEX: 35.65 KG/M2 | WEIGHT: 214 LBS | TEMPERATURE: 98.4 F | HEIGHT: 65 IN | HEART RATE: 100 BPM | OXYGEN SATURATION: 96 %

## 2025-07-07 DIAGNOSIS — J30.9 ALLERGIC RHINITIS, UNSPECIFIED SEASONALITY, UNSPECIFIED TRIGGER: ICD-10-CM

## 2025-07-07 DIAGNOSIS — T78.40XA ALLERGY, INITIAL ENCOUNTER: ICD-10-CM

## 2025-07-07 DIAGNOSIS — L72.3 SEBACEOUS CYST: Primary | ICD-10-CM

## 2025-07-07 PROCEDURE — 99213 OFFICE O/P EST LOW 20 MIN: CPT | Performed by: FAMILY MEDICINE

## 2025-07-07 RX ORDER — CETIRIZINE HYDROCHLORIDE 10 MG/1
10 TABLET ORAL DAILY
Qty: 90 TABLET | Refills: 2 | Status: SHIPPED | OUTPATIENT
Start: 2025-07-07 | End: 2026-04-03

## 2025-07-07 NOTE — PROGRESS NOTES
Name: Misael Kitchen      : 2000      MRN: [id number]  Encounter Provider: Theodore Beck DO  Encounter Date: 2025   Encounter department: [facility] [place]  :  Assessment & Plan  Sebaceous cyst  Lower neck area, anterior. Approx 1cm.   Expressed sebacious material   Offered capsule removal, pt will think about it and let me know        Allergy, initial encounter  Trial zyrtec   Orders:    cetirizine (ZyrTEC) 10 mg tablet; Take 1 tablet (10 mg total) by mouth daily    Allergic rhinitis, unspecified seasonality, unspecified trigger                History of Present Illness   Patient here for discussion of a nodule in his lower neck.  It has been persistent for some time and has increased in size recently.  It is quite superficial around 1 cm in diameter and upon expression produces sebaceous material.  Consistent with sebaceous cyst.  Otherwise well.  Also noting left nostril clogging.  Has not been able to breathe out of his left nostril for the past 3 months.  No recent trauma or injury.  Denies normal seasonal allergic triggers however has a component of postnasal drip.  Has never been told he had a deviated septum has never seen ENT in the past.  States taking some pills in the past that may have to helped and on reconciliation of medication that medication seems to have been Zyrtec will  retrial  patient to see if it is efficacious.  If not can consider ENT referral.       Review of Systems   Constitutional:  Negative for activity change, fatigue, fever and unexpected weight change.   HENT:  Positive for postnasal drip. Negative for congestion, rhinorrhea and sore throat.         Clogged left nostril    Eyes:  Negative for visual disturbance.   Respiratory:  Negative for cough, chest tightness, shortness of breath and wheezing.    Cardiovascular:  Negative for chest pain, palpitations and leg swelling.   Gastrointestinal:  Negative for abdominal pain, constipation,  "diarrhea, nausea and vomiting.   Endocrine: Negative for polydipsia, polyphagia and polyuria.   Genitourinary:  Negative for decreased urine volume, difficulty urinating and urgency.   Musculoskeletal:  Negative for arthralgias, back pain, gait problem and joint swelling.   Skin:  Negative for rash.   Neurological:  Negative for dizziness, seizures, weakness, light-headedness, numbness and headaches.   Psychiatric/Behavioral:  Negative for behavioral problems and confusion.        Objective   /80 (BP Location: Right arm, Patient Position: Sitting, Cuff Size: Large)   Pulse 100   Temp 98.4 °F (36.9 °C) (Temporal)   Ht 5' 4.6\" (1.641 m)   Wt 97.1 kg (214 lb)   SpO2 96%   BMI 36.05 kg/m²      Physical Exam  Constitutional:       General: He is not in acute distress.     Appearance: Normal appearance. He is not ill-appearing or toxic-appearing.   HENT:      Head: Normocephalic and atraumatic.      Right Ear: External ear normal.      Left Ear: External ear normal.      Nose: Nose normal.     Eyes:      Conjunctiva/sclera: Conjunctivae normal.       Cardiovascular:      Rate and Rhythm: Normal rate and regular rhythm.      Pulses: Normal pulses.      Heart sounds: Normal heart sounds. No murmur heard.     No friction rub. No gallop.   Pulmonary:      Effort: Pulmonary effort is normal. No respiratory distress.      Breath sounds: Normal breath sounds. No wheezing.     Musculoskeletal:         General: Normal range of motion.      Cervical back: Normal range of motion and neck supple.     Skin:     General: Skin is warm and dry.      Capillary Refill: Capillary refill takes less than 2 seconds.      Comments: 1cm sebaceous cyst anterior lower neck      Neurological:      Mental Status: He is alert and oriented to person, place, and time. Mental status is at baseline.     Psychiatric:         Mood and Affect: Mood normal.         Behavior: Behavior normal.         Thought Content: Thought content normal.        "  Judgment: Judgment normal.       Theodore Beck,   PGY-3 [facility] [place]   07/07/25, 4:29 PM

## 2025-07-16 ENCOUNTER — TELEPHONE (OUTPATIENT)
Dept: FAMILY MEDICINE CLINIC | Facility: CLINIC | Age: 25
End: 2025-07-16

## 2025-07-16 NOTE — TELEPHONE ENCOUNTER
Patient has upcoming appointment scheduled for 7/21 for physical with Dr. Beck, but patient has ambetter insurance leave voice message that our office is non-par with this insurance plan and appointment has been cancelled